# Patient Record
Sex: MALE | Race: WHITE | NOT HISPANIC OR LATINO | Employment: FULL TIME | URBAN - METROPOLITAN AREA
[De-identification: names, ages, dates, MRNs, and addresses within clinical notes are randomized per-mention and may not be internally consistent; named-entity substitution may affect disease eponyms.]

---

## 2023-10-19 ENCOUNTER — OFFICE VISIT (OUTPATIENT)
Dept: NEUROLOGY | Facility: CLINIC | Age: 58
End: 2023-10-19
Payer: COMMERCIAL

## 2023-10-19 VITALS
HEART RATE: 73 BPM | RESPIRATION RATE: 16 BRPM | WEIGHT: 239 LBS | DIASTOLIC BLOOD PRESSURE: 92 MMHG | SYSTOLIC BLOOD PRESSURE: 142 MMHG | HEIGHT: 73 IN | BODY MASS INDEX: 31.68 KG/M2 | TEMPERATURE: 96.8 F

## 2023-10-19 DIAGNOSIS — M54.50 CHRONIC LOW BACK PAIN, UNSPECIFIED BACK PAIN LATERALITY, UNSPECIFIED WHETHER SCIATICA PRESENT: ICD-10-CM

## 2023-10-19 DIAGNOSIS — R20.2 PARESTHESIAS: Primary | ICD-10-CM

## 2023-10-19 DIAGNOSIS — R06.83 SNORING: ICD-10-CM

## 2023-10-19 DIAGNOSIS — G89.29 CHRONIC LOW BACK PAIN, UNSPECIFIED BACK PAIN LATERALITY, UNSPECIFIED WHETHER SCIATICA PRESENT: ICD-10-CM

## 2023-10-19 DIAGNOSIS — G47.30 OBSERVED SLEEP APNEA: ICD-10-CM

## 2023-10-19 DIAGNOSIS — G62.9 POLYNEUROPATHY: ICD-10-CM

## 2023-10-19 PROCEDURE — 1036F TOBACCO NON-USER: CPT | Performed by: PSYCHIATRY & NEUROLOGY

## 2023-10-19 PROCEDURE — 99204 OFFICE O/P NEW MOD 45 MIN: CPT | Performed by: PSYCHIATRY & NEUROLOGY

## 2023-10-19 RX ORDER — EVOLOCUMAB 140 MG/ML
140 INJECTION, SOLUTION SUBCUTANEOUS
COMMUNITY
Start: 2017-04-23 | End: 2024-01-23 | Stop reason: WASHOUT

## 2023-10-19 RX ORDER — AMLODIPINE BESYLATE 10 MG/1
10 TABLET ORAL DAILY
COMMUNITY
End: 2024-01-23 | Stop reason: WASHOUT

## 2023-10-19 RX ORDER — LATANOPROSTENE BUNOD 0.24 MG/ML
SOLUTION/ DROPS OPHTHALMIC
COMMUNITY
End: 2024-01-23 | Stop reason: WASHOUT

## 2023-10-19 RX ORDER — PANTOPRAZOLE SODIUM 40 MG/1
40 TABLET, DELAYED RELEASE ORAL
COMMUNITY
End: 2024-01-23 | Stop reason: WASHOUT

## 2023-10-19 RX ORDER — ASPIRIN 81 MG/1
81 TABLET ORAL DAILY
COMMUNITY
End: 2024-01-23 | Stop reason: WASHOUT

## 2023-10-19 RX ORDER — METOPROLOL TARTRATE 25 MG/1
25 TABLET, FILM COATED ORAL 2 TIMES DAILY
COMMUNITY
End: 2024-01-23 | Stop reason: WASHOUT

## 2023-10-19 RX ORDER — SILDENAFIL 50 MG/1
50 TABLET, FILM COATED ORAL AS NEEDED
COMMUNITY
Start: 2023-09-23 | End: 2024-01-23 | Stop reason: WASHOUT

## 2023-10-19 ASSESSMENT — ENCOUNTER SYMPTOMS
NUMBNESS: 1
WEAKNESS: 1

## 2023-10-19 NOTE — PATIENT INSTRUCTIONS
The patient needs an EMG nerve conduction study of the right upper and right lower extremity.  The patient needs an MRI of the lumbar spine without contrast.  The patient needs a polyneuropathy panel.  The patient should try to stay as active as he can both mentally and physically.  The patient does need a diagnostic polysomnogram.  The patient needs to lose weight to his ideal body weight, avoid supine position, improve sleep hygiene and get least 8 hours of sleep at night.  The patient should not drive while drowsy.  I discussed all these issues in detail with the patient and answered all of his questions.  The patient will follow-up with me in 6 months.

## 2023-10-19 NOTE — PROGRESS NOTES
Subjective     Javy Carnes Jr. is a 58 y.o. year old male    HPI  The patient states that he began to notice numbness in his feet bilaterally.  He states that the numbness started in the toes and it has spread to involve his feet as well.  He does feel that his legs are somewhat weak but he denies difficulty getting out of a chair or going up or down stairs.  The patient states that if he is standing in place for any length of time that the symptoms will worsen and that if he is moving around the symptoms will improve.  The patient denies that his walking is unsteady or numbness in his fingers.  He states that he has chronic back pain and has had it since 1995.  The patient states that he had neck pain in early 2000's and received an injection in his neck and his neck has been good since that time.  The patient denies any upper extremity weakness or numbness.  He denies any vision changes, speech problems, memory loss, hearing loss or problems with coordination.  He denies any bowel or bladder complaints.  The patient had a lumbar spine x-ray on 6/3/2020 that showed degenerative changes and he had moderate anterior listhesis and minimal retrolisthesis.  The patient has been seen by Dr. Novak in cardiology who did a PVR and the patient reports that this was normal.  The patient has not had an EMG nerve conduction study or recent MRI of the lumbar spine.  The patient denies any history of significant alcohol intake or diabetes.    The patient has residual right facial weakness and states he has had Bell's palsy 3 separate times in his life.  The first time was right out of high school and then over the last 20 years he has had it 2 other times.    The patient states that he has a history of snoring and also observed apneas at night.  The patient has lights out at 11 PM and it takes him just minutes to fall asleep.  The patient will wake to go to the bathroom 1 time at night.  The patient has never woken himself up  "gasping for air.  The patient will wake for the day 6 AM and he does not feel that his sleep is refreshing.  The patient states that he does not nap during the day.    Review of Systems   Neurological:  Positive for weakness and numbness.     There is no problem list on file for this patient.    Past Medical History:   Diagnosis Date    Hyperlipidemia     Hypertension      Past Surgical History:   Procedure Laterality Date    CHOLECYSTECTOMY      IR INTERVENTION VENOUS STENT      TONSILLECTOMY      VASECTOMY       Social History     Tobacco Use    Smoking status: Former     Types: Cigarettes     Passive exposure: Never    Smokeless tobacco: Never   Substance Use Topics    Alcohol use: Yes     Comment: social     family history includes Stroke in his paternal grandmother.    Current Outpatient Medications:     amLODIPine (Norvasc) 10 mg tablet, Take 1 tablet (10 mg) by mouth once daily., Disp: , Rfl:     aspirin 81 mg EC tablet, Take 1 tablet (81 mg) by mouth once daily., Disp: , Rfl:     evolocumab (Repatha SureClick) 140 mg/mL injection, Inject 1 mL (140 mg) under the skin every 14 (fourteen) days., Disp: , Rfl:     latanoprostene bunod (Vyzulta) 0.024 % drops, 1 drop in both eyes before bed, Disp: , Rfl:     metoprolol tartrate (Lopressor) 25 mg tablet, Take 1 tablet (25 mg) by mouth 2 times a day., Disp: , Rfl:     pantoprazole (ProtoNix) 40 mg EC tablet, Take 1 tablet (40 mg) by mouth once daily in the morning. Take before meals., Disp: , Rfl:     sildenafil (Viagra) 50 mg tablet, Take 1 tablet (50 mg) by mouth if needed., Disp: , Rfl:   Allergies   Allergen Reactions    Statins-Hmg-Coa Reductase Inhibitors Unknown     Dr told not to take anymore due to elevated liver enzymes       Objective   BP (!) 142/92 (BP Location: Right arm)   Pulse 73   Temp 36 °C (96.8 °F) (Temporal)   Resp 16   Ht 1.854 m (6' 1\")   Wt 108 kg (239 lb)   BMI 31.53 kg/m²    GENERAL APPEARANCE:  No distress, alert and cooperative. "     The patient has a Mallampati class III airway.    CARDIOVASCULAR: Regular, rate and rhythm, without murmur. No carotid bruits. Pulses +2 and equal in all extremities. No edema, or tenderness to palpation.    MENTAL STATE:  Orientation was normal to time, place and person. Recent and remote memory was intact.  Attention span and concentration were normal. Language testing was normal for comprehension, repetition, expression, and naming. Calculation was intact. The patient could correctly interpret a picture, and copy a diagram. General fund of knowledge was intact. Mini-mental status examination was performed with no errors.     OPHTHALMOSCOPIC: The ophthalmoscopic exam normal. The fundi were well visualized with normal disc margins, clear vessels and vascular pulsations. No disc edema. The cup/disk ratio was not enlarged. No hemorrhages or exudates were present in the posterior segments that were visualized.     CRANIAL NERVES:  Cranial nerves were normal.      CN 2- Visual Acuity  OD: 20/20 (corrected)   OS: 20/20 (corrected); visual fields full to confrontation.      CN 3, 4, 6-  Pupils round, 4 mm in diameter, equally reactive to light. No ptosis. EOMs normal alignment, full range of movement, no nystagmus     CN 5- Facial sensation intact bilaterally. Normal corneal reflexes.      CN 7- Normal and symmetric facial strength. Nasolabial folds symmetric.     CN 8- Hearing intact to finger rub, whisper.      CN 9- Palate elevates symmetrically. Normal gag reflex.      CN 11- Normal strength of shoulder shrug and neck turning      CN 12- Tongue midline, with normal bulk and strength; no fasciculations.     MOTOR:  Motor exam was normal. Muscle bulk and tone were normal in both upper and lower extremities. Muscle strength was 5/5 in distal and proximal muscles in both upper and lower extremities. No fasciculations, tremor or other abnormal movements were present.     REFLEXES:  Right/ Left:  Biceps 1/2,  brachioradialis 1/2, triceps 1/2, patellar 1/2, ankle 0/2  Babinski: toes downgoing to plantar stimulation. No clonus, frontal release signs or other pathologic reflexes present.     SENSORY: Sensory exam was intact to light touch, sharp/dull, and position sense but the patient has decreased sensation to vibration sense in a length dependent manner in the lower extremities bilaterally.    COORDINATION: MINISTERIO were intact in upper and lower extremities.  In UE- finger-nose-finger was intact and in LE- heel-to-shin was intact without dysmetria or overshoot.      GAIT: Station was stable with a normal base and negative Romberg sign. Gait was stable with a normal arm swing and speed. No ataxia, shuffling, steppage or waddling was noted. Tandem gait was intact. Postural reflexes were normal.     Assessment/Plan   Impression: The patient is complaining of paresthesias in his feet.  The patient does have snoring and observed apneas at night.  The patient's neurological examination is mildly abnormal.  The differential diagnosis for the paresthesias in his feet include polyneuropathy, multiple lumbar radiculopathies, lumbosacral plexopathy and lumbar stenosis.  The differential diagnosis for his snoring includes snoring versus sleep apnea.    Plan: The patient needs an EMG nerve conduction study of the right upper and right lower extremity.  The patient needs an MRI of the lumbar spine without contrast.  The patient needs a polyneuropathy panel.  The patient should try to stay as active as he can both mentally and physically.  The patient does need a diagnostic polysomnogram.  The patient needs to lose weight to his ideal body weight, avoid supine position, improve sleep hygiene and get least 8 hours of sleep at night.  The patient should not drive while drowsy.  I discussed all these issues in detail with the patient and answered all of his questions.  The patient will follow-up with me in 6 months.

## 2023-10-21 ENCOUNTER — LAB (OUTPATIENT)
Dept: LAB | Facility: LAB | Age: 58
End: 2023-10-21
Payer: COMMERCIAL

## 2023-10-21 DIAGNOSIS — G62.9 POLYNEUROPATHY: ICD-10-CM

## 2023-10-21 DIAGNOSIS — R20.2 PARESTHESIAS: ICD-10-CM

## 2023-10-21 LAB
ALBUMIN SERPL BCP-MCNC: 4.5 G/DL (ref 3.4–5)
ALP SERPL-CCNC: 89 U/L (ref 33–120)
ALT SERPL W P-5'-P-CCNC: 33 U/L (ref 10–52)
ANION GAP SERPL CALC-SCNC: 11 MMOL/L (ref 10–20)
AST SERPL W P-5'-P-CCNC: 19 U/L (ref 9–39)
BILIRUB SERPL-MCNC: 0.4 MG/DL (ref 0–1.2)
BUN SERPL-MCNC: 14 MG/DL (ref 6–23)
CALCIUM SERPL-MCNC: 9.2 MG/DL (ref 8.6–10.3)
CHLORIDE SERPL-SCNC: 106 MMOL/L (ref 98–107)
CO2 SERPL-SCNC: 27 MMOL/L (ref 21–32)
CREAT SERPL-MCNC: 1.02 MG/DL (ref 0.5–1.3)
ERYTHROCYTE [SEDIMENTATION RATE] IN BLOOD BY WESTERGREN METHOD: 8 MM/H (ref 0–20)
FOLATE SERPL-MCNC: 13.7 NG/ML
GFR SERPL CREATININE-BSD FRML MDRD: 86 ML/MIN/1.73M*2
GLUCOSE SERPL-MCNC: 160 MG/DL (ref 74–99)
POTASSIUM SERPL-SCNC: 4.8 MMOL/L (ref 3.5–5.3)
PROT SERPL-MCNC: 6.9 G/DL (ref 6.4–8.2)
PROT SERPL-MCNC: 7 G/DL (ref 6.4–8.2)
SODIUM SERPL-SCNC: 139 MMOL/L (ref 136–145)
TSH SERPL-ACNC: 3.61 MIU/L (ref 0.44–3.98)
VIT B12 SERPL-MCNC: 272 PG/ML (ref 211–911)

## 2023-10-21 PROCEDURE — 36415 COLL VENOUS BLD VENIPUNCTURE: CPT

## 2023-10-21 PROCEDURE — 87389 HIV-1 AG W/HIV-1&-2 AB AG IA: CPT

## 2023-10-21 PROCEDURE — 84443 ASSAY THYROID STIM HORMONE: CPT

## 2023-10-21 PROCEDURE — 84165 PROTEIN E-PHORESIS SERUM: CPT

## 2023-10-21 PROCEDURE — 82607 VITAMIN B-12: CPT

## 2023-10-21 PROCEDURE — 82746 ASSAY OF FOLIC ACID SERUM: CPT

## 2023-10-21 PROCEDURE — 83921 ORGANIC ACID SINGLE QUANT: CPT

## 2023-10-21 PROCEDURE — 84155 ASSAY OF PROTEIN SERUM: CPT

## 2023-10-21 PROCEDURE — 80053 COMPREHEN METABOLIC PANEL: CPT

## 2023-10-21 PROCEDURE — 83036 HEMOGLOBIN GLYCOSYLATED A1C: CPT

## 2023-10-21 PROCEDURE — 85652 RBC SED RATE AUTOMATED: CPT

## 2023-10-22 LAB
EST. AVERAGE GLUCOSE BLD GHB EST-MCNC: 123 MG/DL
HBA1C MFR BLD: 5.9 %
HIV 1+2 AB+HIV1 P24 AG SERPL QL IA: NONREACTIVE

## 2023-10-23 LAB
ALBUMIN: 4.3 G/DL (ref 3.4–5)
ALPHA 1 GLOBULIN: 0.3 G/DL (ref 0.2–0.6)
ALPHA 2 GLOBULIN: 0.6 G/DL (ref 0.4–1.1)
BETA GLOBULIN: 0.8 G/DL (ref 0.5–1.2)
GAMMA GLOBULIN: 1 G/DL (ref 0.5–1.4)
PATH REVIEW-SERUM PROTEIN ELECTROPHORESIS: NORMAL
PROTEIN ELECTROPHORESIS COMMENT: NORMAL

## 2023-10-25 LAB — METHYLMALONATE SERPL-SCNC: 0.11 UMOL/L (ref 0–0.4)

## 2023-10-30 ENCOUNTER — LAB (OUTPATIENT)
Dept: LAB | Facility: LAB | Age: 58
End: 2023-10-30
Payer: COMMERCIAL

## 2023-10-30 DIAGNOSIS — R20.2 PARESTHESIAS: ICD-10-CM

## 2023-10-30 DIAGNOSIS — G62.9 POLYNEUROPATHY: ICD-10-CM

## 2023-10-30 PROCEDURE — 82175 ASSAY OF ARSENIC: CPT

## 2023-11-03 LAB
ARSENIC 24H UR-MRATE: NORMAL UG/D (ref 0–49.9)
ARSENIC UR-MCNC: <10 UG/L (ref 0–34.9)
ARSENIC/CREAT UR: NORMAL UG/G CRT (ref 0–29.9)
COLLECT DURATION TIME SPEC: NORMAL HR
CREAT 24H UR-MRATE: NORMAL MG/D (ref 800–2100)
CREAT UR-MCNC: 148 MG/DL
LEAD 24H UR-MRATE: NORMAL UG/D (ref 0–8.1)
LEAD UR-MCNC: <5 UG/L (ref 0–5)
LEAD/CREAT UR: NORMAL UG/G CRT (ref 0–5)
MERCURY 24H UR-MRATE: NORMAL UG/D (ref 0–20)
MERCURY UR-MCNC: <2.5 UG/L (ref 0–5)
MERCURY/CREAT UR: NORMAL UG/G CRT (ref 0–20)
SPECIMEN VOL ?TM UR: NORMAL ML

## 2023-11-08 ENCOUNTER — HOSPITAL ENCOUNTER (OUTPATIENT)
Dept: RADIOLOGY | Facility: HOSPITAL | Age: 58
Discharge: HOME | End: 2023-11-08
Payer: COMMERCIAL

## 2023-11-08 DIAGNOSIS — M54.50 CHRONIC LOW BACK PAIN, UNSPECIFIED BACK PAIN LATERALITY, UNSPECIFIED WHETHER SCIATICA PRESENT: ICD-10-CM

## 2023-11-08 DIAGNOSIS — G89.29 CHRONIC LOW BACK PAIN, UNSPECIFIED BACK PAIN LATERALITY, UNSPECIFIED WHETHER SCIATICA PRESENT: ICD-10-CM

## 2023-11-08 DIAGNOSIS — R20.2 PARESTHESIAS: ICD-10-CM

## 2023-11-08 PROCEDURE — 72148 MRI LUMBAR SPINE W/O DYE: CPT

## 2023-11-08 PROCEDURE — 72148 MRI LUMBAR SPINE W/O DYE: CPT | Performed by: RADIOLOGY

## 2023-11-20 DIAGNOSIS — R20.2 PARESTHESIAS: ICD-10-CM

## 2023-11-20 DIAGNOSIS — G89.29 CHRONIC LOW BACK PAIN, UNSPECIFIED BACK PAIN LATERALITY, UNSPECIFIED WHETHER SCIATICA PRESENT: ICD-10-CM

## 2023-11-20 DIAGNOSIS — G62.9 POLYNEUROPATHY: Primary | ICD-10-CM

## 2023-11-20 DIAGNOSIS — M54.50 CHRONIC LOW BACK PAIN, UNSPECIFIED BACK PAIN LATERALITY, UNSPECIFIED WHETHER SCIATICA PRESENT: ICD-10-CM

## 2023-11-24 ENCOUNTER — CLINICAL SUPPORT (OUTPATIENT)
Dept: SLEEP MEDICINE | Facility: CLINIC | Age: 58
End: 2023-11-24
Payer: COMMERCIAL

## 2023-11-24 DIAGNOSIS — R06.83 SNORING: ICD-10-CM

## 2023-11-24 DIAGNOSIS — G47.33 OBSTRUCTIVE SLEEP APNEA (ADULT) (PEDIATRIC): ICD-10-CM

## 2023-11-24 DIAGNOSIS — G47.30 OBSERVED SLEEP APNEA: ICD-10-CM

## 2023-11-24 PROCEDURE — 95810 POLYSOM 6/> YRS 4/> PARAM: CPT | Performed by: PSYCHIATRY & NEUROLOGY

## 2023-11-25 VITALS
RESPIRATION RATE: 16 BRPM | BODY MASS INDEX: 31.41 KG/M2 | SYSTOLIC BLOOD PRESSURE: 150 MMHG | DIASTOLIC BLOOD PRESSURE: 81 MMHG | HEIGHT: 73 IN | WEIGHT: 236.99 LBS

## 2023-11-25 ASSESSMENT — SLEEP AND FATIGUE QUESTIONNAIRES
HOW LIKELY ARE YOU TO NOD OFF OR FALL ASLEEP WHEN YOU ARE A PASSENGER IN A CAR FOR AN HOUR WITHOUT A BREAK: WOULD NEVER DOZE
HOW LIKELY ARE YOU TO NOD OFF OR FALL ASLEEP WHILE SITTING QUIETLY AFTER LUNCH WITHOUT ALCOHOL: WOULD NEVER DOZE
HOW LIKELY ARE YOU TO NOD OFF OR FALL ASLEEP WHILE LYING DOWN TO REST IN THE AFTERNOON WHEN CIRCUMSTANCES PERMIT: SLIGHT CHANCE OF DOZING
HOW LIKELY ARE YOU TO NOD OFF OR FALL ASLEEP IN A CAR, WHILE STOPPED FOR A FEW MINUTES IN TRAFFIC: WOULD NEVER DOZE
HOW LIKELY ARE YOU TO NOD OFF OR FALL ASLEEP WHILE SITTING AND READING: SLIGHT CHANCE OF DOZING
SITING INACTIVE IN A PUBLIC PLACE LIKE A CLASS ROOM OR A MOVIE THEATER: WOULD NEVER DOZE
HOW LIKELY ARE YOU TO NOD OFF OR FALL ASLEEP WHILE SITTING AND TALKING TO SOMEONE: WOULD NEVER DOZE
ESS-CHAD TOTAL SCORE: 4
HOW LIKELY ARE YOU TO NOD OFF OR FALL ASLEEP WHILE WATCHING TV: MODERATE CHANCE OF DOZING

## 2023-11-25 NOTE — PROGRESS NOTES
[facility] TECH NOTE:     Patient: Javy Carnes Jr.   MRN//AGE: [id number]  1965  58 y.o.   Technologist: Francesca Villa   Room: 1   Service Date: 2023        Sleep Testing Location: [place]  Neck: 44 cm  Wall: 4    TECHNOLOGIST SLEEP STUDY PROCEDURE NOTE:   This sleep study is being conducted according to the policies and procedures outlined by the AAS accreditation standards.  The sleep study procedure and processes involved during this appointment was explained to the patient/patient’s family, questions were answered. The patient/family verbalized understanding.      The patient is a 58 year old male scheduled for a diagnostic PSG with montage of: PSG.  He arrived for his appointment.      The study that was ultimately completed was a diagnostic PSG with montage of: PSG.    The full study was completed.  Patient questionnaires completed?: yes     Consents signed? yes    Initial Fall Risk Screening:     Javy has not fallen in the last 6 months.  Javy does not have a fear of falling. He does not need assistance with sitting, standing, or walking. He does not need assistance walking in his home. he  does not need assistance in an unfamiliar setting. The patient is not using an assistive device.     Brief Study observations: patient presented to the sleep lab for a diagnostic PSG.  Split criteria was not met.    Deviation to order/protocol and reason: n/a      If PAP, which was preferred mask/pressure/mode: n/a      Other: n/a    After the procedure, the patient/family was informed to ensure followup with ordering clinician for testing results.      Technologist: Francesca Villa

## 2023-12-01 DIAGNOSIS — I44.30 AV BLOCK: ICD-10-CM

## 2023-12-05 DIAGNOSIS — G47.33 OSA (OBSTRUCTIVE SLEEP APNEA): ICD-10-CM

## 2023-12-26 ENCOUNTER — APPOINTMENT (OUTPATIENT)
Dept: SLEEP MEDICINE | Facility: CLINIC | Age: 58
End: 2023-12-26
Payer: COMMERCIAL

## 2024-01-23 ENCOUNTER — OFFICE VISIT (OUTPATIENT)
Dept: NEUROSURGERY | Facility: CLINIC | Age: 59
End: 2024-01-23
Payer: COMMERCIAL

## 2024-01-23 VITALS — WEIGHT: 234 LBS | BODY MASS INDEX: 31.01 KG/M2 | HEIGHT: 73 IN

## 2024-01-23 DIAGNOSIS — M54.17 RADICULOPATHY OF LUMBOSACRAL REGION: ICD-10-CM

## 2024-01-23 DIAGNOSIS — M48.07 LUMBOSACRAL STENOSIS WITH NEUROGENIC CLAUDICATION: ICD-10-CM

## 2024-01-23 DIAGNOSIS — M43.17 SPONDYLOLISTHESIS OF LUMBOSACRAL REGION: Primary | ICD-10-CM

## 2024-01-23 PROCEDURE — 1036F TOBACCO NON-USER: CPT | Performed by: NEUROLOGICAL SURGERY

## 2024-01-23 PROCEDURE — 99204 OFFICE O/P NEW MOD 45 MIN: CPT | Performed by: NEUROLOGICAL SURGERY

## 2024-01-23 RX ORDER — LATANOPROSTENE BUNOD 0.24 MG/ML
SOLUTION/ DROPS OPHTHALMIC DAILY
COMMUNITY

## 2024-01-23 RX ORDER — PANTOPRAZOLE SODIUM 40 MG/1
40 TABLET, DELAYED RELEASE ORAL
COMMUNITY

## 2024-01-23 RX ORDER — ASPIRIN 81 MG/1
81 TABLET ORAL 2 TIMES DAILY
COMMUNITY

## 2024-01-23 RX ORDER — AMLODIPINE BESYLATE 10 MG/1
10 TABLET ORAL DAILY
COMMUNITY

## 2024-01-23 RX ORDER — METOPROLOL TARTRATE 25 MG/1
25 TABLET, FILM COATED ORAL 2 TIMES DAILY
COMMUNITY

## 2024-01-23 RX ORDER — EVOLOCUMAB 140 MG/ML
140 INJECTION, SOLUTION SUBCUTANEOUS
COMMUNITY

## 2024-01-23 RX ORDER — SILDENAFIL 50 MG/1
50 TABLET, FILM COATED ORAL DAILY PRN
COMMUNITY

## 2024-01-23 ASSESSMENT — PATIENT HEALTH QUESTIONNAIRE - PHQ9
2. FEELING DOWN, DEPRESSED OR HOPELESS: NOT AT ALL
SUM OF ALL RESPONSES TO PHQ9 QUESTIONS 1 AND 2: 0
1. LITTLE INTEREST OR PLEASURE IN DOING THINGS: NOT AT ALL

## 2024-01-23 ASSESSMENT — ENCOUNTER SYMPTOMS: OCCASIONAL FEELINGS OF UNSTEADINESS: 0

## 2024-01-23 NOTE — PROGRESS NOTES
[facility] [place]  Department of Neurological Surgery  New Patient Visit    History of Present Illness:  Javy Carnes Jr. is a 58 y.o. year old male who presents to the spine clinic with complaints of mid back pain but significant numbness in his legs bilaterally.  He gets discomfort when he stands and walks but he is very comfortable sitting down.  He is also able to continue to play golf this past summer.  He says the pain is about a 4-5 out of 10 on the pain scale.  He has not done physical therapy recently.  He has not had any steroid injections in his lumbar region ever.  He was unaware of ever having fractured his lumbar spine.  He comes to us with an MRI of the lumbar spine.  He denies any loss of bowel or bladder function.    Prior Spine Surgeries: None   Physical Therapy: Not recently  Diabetic: No  Osteoporosis: No  Patient's BMI is Body mass index is 30.87 kg/m².    14/14 systems reviewed and negative other than what is listed in the history of present illness    Patient Active Problem List   Diagnosis    Spondylolisthesis of lumbosacral region    Lumbosacral stenosis with neurogenic claudication    Radiculopathy of lumbosacral region     Past Medical History:   Diagnosis Date    Hyperlipidemia     Hypertension      Past Surgical History:   Procedure Laterality Date    CHOLECYSTECTOMY      IR INTERVENTION VENOUS STENT      TONSILLECTOMY      VASECTOMY       Social History     Tobacco Use    Smoking status: Former     Types: Cigarettes     Passive exposure: Never    Smokeless tobacco: Never   Substance Use Topics    Alcohol use: Yes     Comment: social     family history includes Stroke in his paternal grandmother.    Current Outpatient Medications:     amLODIPine (Norvasc) 10 mg tablet, Take 1 tablet (10 mg) by mouth once daily., Disp: , Rfl:     aspirin 81 mg EC tablet, Take 1 tablet (81 mg) by mouth 2 times a day., Disp: , Rfl:     evolocumab (Repatha SureClick) 140 mg/mL injection,  Inject 1 mL (140 mg) under the skin every 14 (fourteen) days., Disp: , Rfl:     latanoprostene bunod (Vyzulta) 0.024 % drops, Administer into affected eye(s) once daily., Disp: , Rfl:     metoprolol tartrate (Lopressor) 25 mg tablet, Take 1 tablet (25 mg) by mouth 2 times a day., Disp: , Rfl:     pantoprazole (ProtoNix) 40 mg EC tablet, Take 1 tablet (40 mg) by mouth once daily in the morning. Take before meals. Do not crush, chew, or split., Disp: , Rfl:     sildenafil (Viagra) 50 mg tablet, Take 1 tablet (50 mg) by mouth once daily as needed for erectile dysfunction., Disp: , Rfl:   Allergies   Allergen Reactions    Statins-Hmg-Coa Reductase Inhibitors Unknown     Dr told not to take anymore due to elevated liver enzymes       Physical Examination      General: NAD, AOx 3,  no aphasia or dysarthria, normal fund of knowledge  Cranial Nerves II-XII: VFF, PERRL, EOMI, Face asymmetric but this is subtle.  He has had Bell's palsy 3 times, Facial SILT, Palate/Tongue midline and symmetric  Motor: 5/5 Throughout all extremities,  No drift, no dysmetria on finger to nose  Sensation: SILT and PP throughout all extremities  DTRS: 1+ Throughout in the upper extremities but I could not get reflexes even with recruitment techniques at the patella or the Achilles, No Hoffmans or Clonus  His gait was normal nonantalgic, he could toe and heel walk without difficulty, he has a negative Romberg    Results    I personally reviewed and interpreted the imaging results which included MRI of the lumbar spine which is dated 11/8/2023.  It demonstrates that he has bilateral pars fracture at L5 with bilateral foraminal stenosis at L5-S1.  It also shows that the L4-5 disc is slightly protruding and he has significant lateral recess stenosis on the right side.  I believe the right-sided radiculopathy is coming from the lateral recess stenosis at L4-5 and I believe the left-sided L5 radiculopathy is coming from the foraminal stenosis.   Unfortunately if we got to the point of considering surgical intervention it would probably incorporate both levels.    Assessment and Plan:    Javy Carnes Jr. is a 58 y.o. year old male who presents to the spine clinic with symptoms of neurogenic claudication.  He has been found to have a grade 1 spondylolisthesis at L5-S1 and some lateral recess stenosis at L4-5.  He has lost about 15 to 20 pounds in the past several months.  I encouraged him to continue to work at weight reduction but on the slower pace.  I think I am going to get him enrolled in a physical therapy program to work on his core strengthening.  I am also going to give him prescription for pain management in case the therapy is not enough we can have them try some epidural steroids both the L4-5 level and the L5-S1 level.  He can follow-up with us at any time if he is not getting enough relief.      I have reviewed all prior documentation and reviewed the electronic medical record since admission. I have personally have reviewed all advanced imaging not just the reports and used my interpretation as documented as the relevant findings. I have reviewed the risks and benefits of all treatment recommendations listed in this note with the patient and family. I spent a total of 45 minutes in service to this patient's care during this date of service.      The above clinical summary has been dictated with voice recognition software. It has not been proofread for grammatical errors, typographical mistakes, or other semantic inconsistencies.    Thank you for visiting our office today. It was our pleasure to take part in your healthcare.     Do not hesitate to call with any questions regarding your plan of care after leaving. My office can be reached at [phone] M-F 8am-4pm.     To clinicians, thank you very much for this kind referral. It is a privilege to partner with you in the care of your patients. My office would be delighted to assist you with  any further consultations or with questions regarding the plan of care outlined. Do not hesitate to call the office or contact me directly.     Sincerely,    Steve Prince MD, FAANS, FACS  Board Certified Neurological Surgeon  , Department of Neurological Surgery  [employer]    [facility]  [address]  [facility] [address]  [place] [id number]    [facility]  [address]  [address]  [place] [address]    Phone: [phone]  Fax: [phone]

## 2024-01-30 ENCOUNTER — HOSPITAL ENCOUNTER (OUTPATIENT)
Dept: NEUROLOGY | Facility: HOSPITAL | Age: 59
Discharge: HOME | End: 2024-01-30
Payer: COMMERCIAL

## 2024-01-30 DIAGNOSIS — G62.9 POLYNEUROPATHY: ICD-10-CM

## 2024-01-30 DIAGNOSIS — M54.50 CHRONIC LOW BACK PAIN, UNSPECIFIED BACK PAIN LATERALITY, UNSPECIFIED WHETHER SCIATICA PRESENT: ICD-10-CM

## 2024-01-30 DIAGNOSIS — R20.2 PARESTHESIAS: ICD-10-CM

## 2024-01-30 DIAGNOSIS — G89.29 CHRONIC LOW BACK PAIN, UNSPECIFIED BACK PAIN LATERALITY, UNSPECIFIED WHETHER SCIATICA PRESENT: ICD-10-CM

## 2024-01-30 PROCEDURE — 95886 MUSC TEST DONE W/N TEST COMP: CPT | Performed by: PSYCHIATRY & NEUROLOGY

## 2024-01-30 PROCEDURE — 95913 NRV CNDJ TEST 13/> STUDIES: CPT | Performed by: PSYCHIATRY & NEUROLOGY

## 2024-04-01 ENCOUNTER — PROCEDURE VISIT (OUTPATIENT)
Dept: SLEEP MEDICINE | Facility: CLINIC | Age: 59
End: 2024-04-01
Payer: COMMERCIAL

## 2024-04-01 VITALS
SYSTOLIC BLOOD PRESSURE: 146 MMHG | HEIGHT: 73 IN | BODY MASS INDEX: 30.97 KG/M2 | WEIGHT: 233.69 LBS | DIASTOLIC BLOOD PRESSURE: 83 MMHG

## 2024-04-01 DIAGNOSIS — G47.33 OSA (OBSTRUCTIVE SLEEP APNEA): ICD-10-CM

## 2024-04-01 PROCEDURE — 95811 POLYSOM 6/>YRS CPAP 4/> PARM: CPT | Performed by: PSYCHIATRY & NEUROLOGY

## 2024-04-01 ASSESSMENT — SLEEP AND FATIGUE QUESTIONNAIRES
HOW LIKELY ARE YOU TO NOD OFF OR FALL ASLEEP WHILE LYING DOWN TO REST IN THE AFTERNOON WHEN CIRCUMSTANCES PERMIT: WOULD NEVER DOZE
HOW LIKELY ARE YOU TO NOD OFF OR FALL ASLEEP WHILE SITTING AND READING: WOULD NEVER DOZE
SITING INACTIVE IN A PUBLIC PLACE LIKE A CLASS ROOM OR A MOVIE THEATER: WOULD NEVER DOZE
HOW LIKELY ARE YOU TO NOD OFF OR FALL ASLEEP WHEN YOU ARE A PASSENGER IN A CAR FOR AN HOUR WITHOUT A BREAK: WOULD NEVER DOZE
HOW LIKELY ARE YOU TO NOD OFF OR FALL ASLEEP IN A CAR, WHILE STOPPED FOR A FEW MINUTES IN TRAFFIC: WOULD NEVER DOZE
ESS-CHAD TOTAL SCORE: 1
HOW LIKELY ARE YOU TO NOD OFF OR FALL ASLEEP WHILE WATCHING TV: SLIGHT CHANCE OF DOZING
HOW LIKELY ARE YOU TO NOD OFF OR FALL ASLEEP WHILE SITTING AND TALKING TO SOMEONE: WOULD NEVER DOZE
HOW LIKELY ARE YOU TO NOD OFF OR FALL ASLEEP WHILE SITTING QUIETLY AFTER LUNCH WITHOUT ALCOHOL: WOULD NEVER DOZE

## 2024-04-02 NOTE — PROGRESS NOTES
[facility] TECH NOTE:     Patient: Javy Carnes Jr.   MRN//AGE: [id number]  1965  58 y.o.   Technologist: Tiesha Alvares   Room: 3   Service Date: 2024        [facility]    TECHNOLOGIST SLEEP STUDY PROCEDURE NOTE:   This sleep study is being conducted according to the policies and procedures outlined by the AAS accreditation standards.  The sleep study procedure and processes involved during this appointment was explained to the patient/patient’s family, questions were answered. The patient/family verbalized understanding.      The patient is a 58 y.o. year old male scheduled for aCPAP titration with montage of:  CPAP . he arrived for his appointment.      The study that was ultimately completed was aCPAP titration with montage of:  CPAP .    The full study Was completed.  Patient questionnaires completed?: yes     Consents signed? yes    Initial Fall Risk Screening:     Javy has not fallen in the last 6 months. his did not result in injury. Javy does not have a fear of falling. He does not need assistance with sitting, standing, or walking. he does not need assistance walking in his home. he does not need assistance in an unfamiliar setting. The patient is notusing an assistive device.     Brief Study observations: All sensors applied     Deviation to order/protocol and reason: None      If PAP, which was preferred mask/pressure/mode: CPAP. Respironics Nuance  Progel nasal pillows Medium    Other:None    After the procedure, the patient/family was informed to ensure followup with ordering clinician for testing results.      Technologist: BROOKS Reddy

## 2024-04-25 DIAGNOSIS — G47.33 OBSTRUCTIVE SLEEP APNEA: ICD-10-CM

## 2024-05-09 ENCOUNTER — OFFICE VISIT (OUTPATIENT)
Dept: PAIN MEDICINE | Facility: CLINIC | Age: 59
End: 2024-05-09
Payer: COMMERCIAL

## 2024-05-09 VITALS
HEART RATE: 59 BPM | HEIGHT: 72 IN | RESPIRATION RATE: 18 BRPM | OXYGEN SATURATION: 98 % | BODY MASS INDEX: 31.56 KG/M2 | WEIGHT: 233 LBS | TEMPERATURE: 97.5 F | SYSTOLIC BLOOD PRESSURE: 140 MMHG | DIASTOLIC BLOOD PRESSURE: 94 MMHG

## 2024-05-09 DIAGNOSIS — M48.07 LUMBOSACRAL STENOSIS WITH NEUROGENIC CLAUDICATION: ICD-10-CM

## 2024-05-09 DIAGNOSIS — M54.17 RADICULOPATHY OF LUMBOSACRAL REGION: ICD-10-CM

## 2024-05-09 DIAGNOSIS — M43.17 SPONDYLOLISTHESIS OF LUMBOSACRAL REGION: ICD-10-CM

## 2024-05-09 PROCEDURE — 99213 OFFICE O/P EST LOW 20 MIN: CPT | Performed by: ANESTHESIOLOGY

## 2024-05-09 PROCEDURE — 99203 OFFICE O/P NEW LOW 30 MIN: CPT | Performed by: ANESTHESIOLOGY

## 2024-05-09 SDOH — ECONOMIC STABILITY: FOOD INSECURITY: WITHIN THE PAST 12 MONTHS, YOU WORRIED THAT YOUR FOOD WOULD RUN OUT BEFORE YOU GOT MONEY TO BUY MORE.: NEVER TRUE

## 2024-05-09 SDOH — ECONOMIC STABILITY: FOOD INSECURITY: WITHIN THE PAST 12 MONTHS, THE FOOD YOU BOUGHT JUST DIDN'T LAST AND YOU DIDN'T HAVE MONEY TO GET MORE.: NEVER TRUE

## 2024-05-09 ASSESSMENT — COLUMBIA-SUICIDE SEVERITY RATING SCALE - C-SSRS
2. HAVE YOU ACTUALLY HAD ANY THOUGHTS OF KILLING YOURSELF?: NO
1. IN THE PAST MONTH, HAVE YOU WISHED YOU WERE DEAD OR WISHED YOU COULD GO TO SLEEP AND NOT WAKE UP?: NO
6. HAVE YOU EVER DONE ANYTHING, STARTED TO DO ANYTHING, OR PREPARED TO DO ANYTHING TO END YOUR LIFE?: NO

## 2024-05-09 ASSESSMENT — ENCOUNTER SYMPTOMS
EYE PAIN: 0
ADENOPATHY: 0
LOSS OF SENSATION IN FEET: 1
BACK PAIN: 1
WEAKNESS: 1
FEVER: 0
SHORTNESS OF BREATH: 0
OCCASIONAL FEELINGS OF UNSTEADINESS: 0
DEPRESSION: 0
ABDOMINAL PAIN: 0
NUMBNESS: 1

## 2024-05-09 ASSESSMENT — LIFESTYLE VARIABLES
SKIP TO QUESTIONS 9-10: 1
HOW OFTEN DO YOU HAVE A DRINK CONTAINING ALCOHOL: 2-4 TIMES A MONTH
AUDIT-C TOTAL SCORE: 2
HOW MANY STANDARD DRINKS CONTAINING ALCOHOL DO YOU HAVE ON A TYPICAL DAY: 1 OR 2
HOW OFTEN DO YOU HAVE SIX OR MORE DRINKS ON ONE OCCASION: NEVER
TOTAL SCORE: 0

## 2024-05-09 ASSESSMENT — PAIN SCALES - GENERAL
PAINLEVEL_OUTOF10: 3
PAINLEVEL: 3

## 2024-05-09 ASSESSMENT — PAIN DESCRIPTION - DESCRIPTORS: DESCRIPTORS: ACHING;BURNING;TINGLING;NUMBNESS

## 2024-05-09 ASSESSMENT — PATIENT HEALTH QUESTIONNAIRE - PHQ9
SUM OF ALL RESPONSES TO PHQ9 QUESTIONS 1 AND 2: 0
2. FEELING DOWN, DEPRESSED OR HOPELESS: NOT AT ALL
1. LITTLE INTEREST OR PLEASURE IN DOING THINGS: NOT AT ALL

## 2024-05-09 ASSESSMENT — PAIN - FUNCTIONAL ASSESSMENT: PAIN_FUNCTIONAL_ASSESSMENT: 0-10

## 2024-05-09 NOTE — PROGRESS NOTES
Chief Complain    New Patient Visit (Pain is in low back, that has been going on 20 years. Deny neck and back surgery. No hip or knee pain at the time. Have images on file with . Currently only been taking asa, heating pad and Tens unit. Start  Pt on Monday. Was referred by Dr tinajero.Would like to discuss cortisone injection.)    History Of Present Illness  Javy Carnes Jr. is a 58 y.o. male here for evaluation of low back pain, radiating to bilateral lower extremity numbness. The patient has been experiencing these symptoms for last 1.5 year(s). The patient describes the pain as aching, numbness. The patient's current pain score is 2-3 on a scale from 0-10. The pain is worsened by bending forward and is alleviated by  TENS unit, resting . Since the start of the symptoms the pain has been worse.    The patient denies any fever, chills, weight loss, bladder/ bowel incontinence, history of cancer, history of IV drug abuse, recent trauma.      Past Medical History  He has a past medical history of Hyperlipidemia and Hypertension.    Surgical History  He has a past surgical history that includes IR intervention venous stent; Cholecystectomy; Tonsillectomy; and Vasectomy.    Social History  He reports that he has quit smoking. His smoking use included cigarettes. He has never been exposed to tobacco smoke. He has never used smokeless tobacco. He reports current alcohol use. He reports that he does not use drugs.    Family History  Family History   Problem Relation Name Age of Onset    Stroke Paternal Grandmother          Allergies  Statins-hmg-coa reductase inhibitors    Review of Systems  Review of Systems   Constitutional:  Negative for fever.   HENT:  Negative for ear pain.    Eyes:  Negative for pain.   Respiratory:  Negative for shortness of breath.    Cardiovascular:  Negative for chest pain.   Gastrointestinal:  Negative for abdominal pain.   Endocrine: Negative for cold intolerance and heat intolerance.    Musculoskeletal:  Positive for back pain.   Skin:  Negative for rash.   Allergic/Immunologic: Negative for food allergies.   Neurological:  Positive for weakness and numbness.   Hematological:  Negative for adenopathy.   Psychiatric/Behavioral:  Negative for suicidal ideas.         Physical Exam  Physical Exam  HENT:      Head: Normocephalic and atraumatic.   Eyes:      Extraocular Movements: Extraocular movements intact.      Pupils: Pupils are equal, round, and reactive to light.   Cardiovascular:      Rate and Rhythm: Normal rate.   Pulmonary:      Effort: Pulmonary effort is normal.   Abdominal:      Palpations: Abdomen is soft.   Musculoskeletal:      Cervical back: Neck supple.      Lumbar back: No swelling, edema, deformity or signs of trauma. Decreased range of motion. No scoliosis.        Back:    Skin:     General: Skin is warm.   Neurological:      Mental Status: He is alert and oriented to person, place, and time.      Motor: Motor function is intact.      Coordination: Coordination is intact.      Gait: Gait is intact.      Deep Tendon Reflexes:      Reflex Scores:       Patellar reflexes are 2+ on the right side and 2+ on the left side.       Achilles reflexes are 0 on the right side and 0 on the left side.  Psychiatric:         Mood and Affect: Mood normal.         Behavior: Behavior normal.           Last Recorded Vitals  Blood pressure (!) 140/94, pulse 59, temperature 36.4 °C (97.5 °F), resp. rate 18, height 1.829 m (6'), weight 106 kg (233 lb), SpO2 98%.    Reviewed Images  Reviewed and independently interpreted MRI Lumbar spine grade 1 anterolisthesis L5 over S1 with bilateral neuroforaminal stenosis as well as lateral recess stenosis at L4-5    Reviewed Labs   Latest Reference Range & Units 10/21/23 08:55   GLUCOSE 74 - 99 mg/dL 160 (H)   SODIUM 136 - 145 mmol/L 139   POTASSIUM 3.5 - 5.3 mmol/L 4.8   CHLORIDE 98 - 107 mmol/L 106   Bicarbonate 21 - 32 mmol/L 27   Anion Gap 10 - 20 mmol/L 11    Blood Urea Nitrogen 6 - 23 mg/dL 14   Creatinine 0.50 - 1.30 mg/dL 1.02   EGFR >60 mL/min/1.73m*2 86   Calcium 8.6 - 10.3 mg/dL 9.2   Albumin 3.4 - 5.0 g/dL 4.5   Alkaline Phosphatase 33 - 120 U/L 89   ALT 10 - 52 U/L 33   AST 9 - 39 U/L 19   Bilirubin Total 0.0 - 1.2 mg/dL 0.4   (H): Data is abnormally high      Latest Reference Range & Units 02/10/23 06:46   LEUKOCYTES (10*3/UL) IN BLOOD BY AUTOMATED COUNT, [place] 4.4 - 11.3 x10E9/L 9.1   nRBC 0.0 - 0.0 /100 WBC 0.0   ERYTHROCYTES (10*6/UL) IN BLOOD BY AUTOMATED COUNT, [place] 4.50 - 5.90 x10E12/L 5.09   HEMOGLOBIN 13.5 - 17.5 g/dL 14.6   HEMATOCRIT 41.0 - 52.0 % 45.2   MCV 80 - 100 fL 89   MCHC 32.0 - 36.0 g/dL 32.3   RED CELL DISTRIBUTION WIDTH 11.5 - 14.5 % 13.5   PLATELETS (10*3/UL) IN BLOOD AUTOMATED COUNT, [place] 150 - 450 x10E9/L 225   NEUTROPHILS/100 LEUKOCYTES IN BLOOD BY AUTOMATED COUNT, [place] 40.0 - 80.0 % 50.4   Immature Granulocytes %, Automated 0.0 - 0.9 % 0.3   Lymphocytes % 13.0 - 44.0 % 35.4   Monocytes % 2.0 - 10.0 % 9.8   Eosinophils % 0.0 - 6.0 % 3.3   Basophils % 0.0 - 2.0 % 0.8   NEUTROPHILS (10*3/UL) IN BLOOD BY AUTOMATED COUNT, [place] 1.20 - 7.70 x10E9/L 4.59   Lymphocytes Absolute 1.20 - 4.80 x10E9/L 3.22   Monocytes Absolute 0.10 - 1.00 x10E9/L 0.89   Eosinophils Absolute 0.00 - 0.70 x10E9/L 0.30     Assessment/Plan   Encounter Diagnoses   Name Primary?    Spondylolisthesis of lumbosacral region     Radiculopathy of lumbosacral region     Lumbosacral stenosis with neurogenic claudication         Javy Carnes Jr. is a 58 y.o. male here for evaluation of chronic low back pain, numbness of bilateral feet.  He has been experiencing low back issues for several years, numbness in the feet for last couple of years or so.  He was evaluated by neurology, an MRI of his lumbar spine was done which does reveal grade 1 anterolisthesis at L5-S1 as well as lateral recess stenosis L4-5.  He has since then been evaluated by neurosurgery who  has referred him for physical therapy.  Imaging were reviewed, therapeutic options were discussed including epidural steroid injections, surgical interventions.  He would like to go ahead with L4-5 epidural steroid injection.  Continue with physical therapy as previously scheduled.           Conor Carrasco MD

## 2024-05-09 NOTE — H&P (VIEW-ONLY)
Chief Complain    New Patient Visit (Pain is in low back, that has been going on 20 years. Deny neck and back surgery. No hip or knee pain at the time. Have images on file with . Currently only been taking asa, heating pad and Tens unit. Start  Pt on Monday. Was referred by Dr tinajero.Would like to discuss cortisone injection.)    History Of Present Illness  Javy Carnes Jr. is a 58 y.o. male here for evaluation of low back pain, radiating to bilateral lower extremity numbness. The patient has been experiencing these symptoms for last 1.5 year(s). The patient describes the pain as aching, numbness. The patient's current pain score is 2-3 on a scale from 0-10. The pain is worsened by bending forward and is alleviated by  TENS unit, resting . Since the start of the symptoms the pain has been worse.    The patient denies any fever, chills, weight loss, bladder/ bowel incontinence, history of cancer, history of IV drug abuse, recent trauma.      Past Medical History  He has a past medical history of Hyperlipidemia and Hypertension.    Surgical History  He has a past surgical history that includes IR intervention venous stent; Cholecystectomy; Tonsillectomy; and Vasectomy.    Social History  He reports that he has quit smoking. His smoking use included cigarettes. He has never been exposed to tobacco smoke. He has never used smokeless tobacco. He reports current alcohol use. He reports that he does not use drugs.    Family History  Family History   Problem Relation Name Age of Onset    Stroke Paternal Grandmother          Allergies  Statins-hmg-coa reductase inhibitors    Review of Systems  Review of Systems   Constitutional:  Negative for fever.   HENT:  Negative for ear pain.    Eyes:  Negative for pain.   Respiratory:  Negative for shortness of breath.    Cardiovascular:  Negative for chest pain.   Gastrointestinal:  Negative for abdominal pain.   Endocrine: Negative for cold intolerance and heat intolerance.    Musculoskeletal:  Positive for back pain.   Skin:  Negative for rash.   Allergic/Immunologic: Negative for food allergies.   Neurological:  Positive for weakness and numbness.   Hematological:  Negative for adenopathy.   Psychiatric/Behavioral:  Negative for suicidal ideas.         Physical Exam  Physical Exam  HENT:      Head: Normocephalic and atraumatic.   Eyes:      Extraocular Movements: Extraocular movements intact.      Pupils: Pupils are equal, round, and reactive to light.   Cardiovascular:      Rate and Rhythm: Normal rate.   Pulmonary:      Effort: Pulmonary effort is normal.   Abdominal:      Palpations: Abdomen is soft.   Musculoskeletal:      Cervical back: Neck supple.      Lumbar back: No swelling, edema, deformity or signs of trauma. Decreased range of motion. No scoliosis.        Back:    Skin:     General: Skin is warm.   Neurological:      Mental Status: He is alert and oriented to person, place, and time.      Motor: Motor function is intact.      Coordination: Coordination is intact.      Gait: Gait is intact.      Deep Tendon Reflexes:      Reflex Scores:       Patellar reflexes are 2+ on the right side and 2+ on the left side.       Achilles reflexes are 0 on the right side and 0 on the left side.  Psychiatric:         Mood and Affect: Mood normal.         Behavior: Behavior normal.           Last Recorded Vitals  Blood pressure (!) 140/94, pulse 59, temperature 36.4 °C (97.5 °F), resp. rate 18, height 1.829 m (6'), weight 106 kg (233 lb), SpO2 98%.    Reviewed Images  Reviewed and independently interpreted MRI Lumbar spine grade 1 anterolisthesis L5 over S1 with bilateral neuroforaminal stenosis as well as lateral recess stenosis at L4-5    Reviewed Labs   Latest Reference Range & Units 10/21/23 08:55   GLUCOSE 74 - 99 mg/dL 160 (H)   SODIUM 136 - 145 mmol/L 139   POTASSIUM 3.5 - 5.3 mmol/L 4.8   CHLORIDE 98 - 107 mmol/L 106   Bicarbonate 21 - 32 mmol/L 27   Anion Gap 10 - 20 mmol/L 11    Blood Urea Nitrogen 6 - 23 mg/dL 14   Creatinine 0.50 - 1.30 mg/dL 1.02   EGFR >60 mL/min/1.73m*2 86   Calcium 8.6 - 10.3 mg/dL 9.2   Albumin 3.4 - 5.0 g/dL 4.5   Alkaline Phosphatase 33 - 120 U/L 89   ALT 10 - 52 U/L 33   AST 9 - 39 U/L 19   Bilirubin Total 0.0 - 1.2 mg/dL 0.4   (H): Data is abnormally high      Latest Reference Range & Units 02/10/23 06:46   LEUKOCYTES (10*3/UL) IN BLOOD BY AUTOMATED COUNT, [place] 4.4 - 11.3 x10E9/L 9.1   nRBC 0.0 - 0.0 /100 WBC 0.0   ERYTHROCYTES (10*6/UL) IN BLOOD BY AUTOMATED COUNT, [place] 4.50 - 5.90 x10E12/L 5.09   HEMOGLOBIN 13.5 - 17.5 g/dL 14.6   HEMATOCRIT 41.0 - 52.0 % 45.2   MCV 80 - 100 fL 89   MCHC 32.0 - 36.0 g/dL 32.3   RED CELL DISTRIBUTION WIDTH 11.5 - 14.5 % 13.5   PLATELETS (10*3/UL) IN BLOOD AUTOMATED COUNT, [place] 150 - 450 x10E9/L 225   NEUTROPHILS/100 LEUKOCYTES IN BLOOD BY AUTOMATED COUNT, [place] 40.0 - 80.0 % 50.4   Immature Granulocytes %, Automated 0.0 - 0.9 % 0.3   Lymphocytes % 13.0 - 44.0 % 35.4   Monocytes % 2.0 - 10.0 % 9.8   Eosinophils % 0.0 - 6.0 % 3.3   Basophils % 0.0 - 2.0 % 0.8   NEUTROPHILS (10*3/UL) IN BLOOD BY AUTOMATED COUNT, [place] 1.20 - 7.70 x10E9/L 4.59   Lymphocytes Absolute 1.20 - 4.80 x10E9/L 3.22   Monocytes Absolute 0.10 - 1.00 x10E9/L 0.89   Eosinophils Absolute 0.00 - 0.70 x10E9/L 0.30     Assessment/Plan   Encounter Diagnoses   Name Primary?    Spondylolisthesis of lumbosacral region     Radiculopathy of lumbosacral region     Lumbosacral stenosis with neurogenic claudication         Javy Carnes Jr. is a 58 y.o. male here for evaluation of chronic low back pain, numbness of bilateral feet.  He has been experiencing low back issues for several years, numbness in the feet for last couple of years or so.  He was evaluated by neurology, an MRI of his lumbar spine was done which does reveal grade 1 anterolisthesis at L5-S1 as well as lateral recess stenosis L4-5.  He has since then been evaluated by neurosurgery who  has referred him for physical therapy.  Imaging were reviewed, therapeutic options were discussed including epidural steroid injections, surgical interventions.  He would like to go ahead with L4-5 epidural steroid injection.  Continue with physical therapy as previously scheduled.           Conor Carrasco MD

## 2024-05-13 ENCOUNTER — EVALUATION (OUTPATIENT)
Dept: PHYSICAL THERAPY | Facility: CLINIC | Age: 59
End: 2024-05-13
Payer: COMMERCIAL

## 2024-05-13 DIAGNOSIS — M54.17 RADICULOPATHY OF LUMBOSACRAL REGION: ICD-10-CM

## 2024-05-13 DIAGNOSIS — M48.07 LUMBOSACRAL STENOSIS WITH NEUROGENIC CLAUDICATION: ICD-10-CM

## 2024-05-13 DIAGNOSIS — M43.17 SPONDYLOLISTHESIS OF LUMBOSACRAL REGION: ICD-10-CM

## 2024-05-13 PROCEDURE — 97161 PT EVAL LOW COMPLEX 20 MIN: CPT | Mod: GP

## 2024-05-13 PROCEDURE — 97110 THERAPEUTIC EXERCISES: CPT | Mod: GP

## 2024-05-20 ENCOUNTER — TREATMENT (OUTPATIENT)
Dept: PHYSICAL THERAPY | Facility: CLINIC | Age: 59
End: 2024-05-20
Payer: COMMERCIAL

## 2024-05-20 DIAGNOSIS — M43.17 SPONDYLOLISTHESIS OF LUMBOSACRAL REGION: ICD-10-CM

## 2024-05-20 DIAGNOSIS — M48.07 LUMBOSACRAL STENOSIS WITH NEUROGENIC CLAUDICATION: ICD-10-CM

## 2024-05-20 DIAGNOSIS — M54.17 RADICULOPATHY OF LUMBOSACRAL REGION: ICD-10-CM

## 2024-05-20 PROCEDURE — 97110 THERAPEUTIC EXERCISES: CPT | Mod: GP,CQ

## 2024-05-20 NOTE — PROGRESS NOTES
Physical Therapy  Physical Therapy Progress Note    Patient Name Javy Carnes Jr.   MRN: [id number]  Today's Date: 05/20/24  Time Calculation  Start Time: 0800  Stop Time: 0838  Time Calculation (min): 38 min    Insurance:     [employer] 20v/pcy 0 used copay $25, pays 100% OOP $6600  -authorization required: no  -visit 2    Therapy Diagnoses:   1. Spondylolisthesis of lumbosacral region  Follow Up In Physical Therapy      2. Radiculopathy of lumbosacral region  Follow Up In Physical Therapy      3. Lumbosacral stenosis with neurogenic claudication  Follow Up In Physical Therapy          General:  Reason for visit: back pain and numbness in feet   Referred by: Dr lakshmi Queen MD appt:  neurologist in a few weeks  Preferred Name:  Javy  Script:  eval and treat  Onset Date:  10/1/23    Assessment:  Patient tolerated treatment well, did well with progression this date. Challenged with hip circles.  Updated HEP  Patient needs continued work on/skilled PT for: strengthening to address remaining functional, objective and subjective deficits to allow them to return to prior /optimal level of function with ADLs.  Patient is progressing with goals: compliance with HEP  Skilled care:  exercise progression    Plan:    Continue to progress per poc:   NV add standing stabilization    Subjective:   Patient reports:  some back pain though a little less than on eval , generally around a 3, numbness is constant. Scheduled for injection Wednesday.    Have you fallen since last visit:  no    Precautions:  Current Medical management:     PMHx: heart disease, two stents 2016, HTN, high cholesterol, gall bladder removed, sleep apnea treatment 5/9/24     Medications for pain: n/a     Diagnostic Tests: MRI    Pain:  3/10  which is average  Location/Type of pain:  back/ numbness in the feet    HEP compliance/understanding:  yes    Objective:   Objective Measurements:        Posture: The pt had rounded shoulders, forward head and decreased  lumbar lordosis. The pt has decreased postural and body mechanics awareness. Slight lateral shift to the L.       Treatment:   **= HEP  NV= Next visit  np= not performed  nb= non-billable  G= group HEP= discharged to HEP  Therapeutic Exercise:     38 minutes  Nu step L3 6  Standing hamstring stretch 20 sec 3x **  Seated flex stretch 10 sec 5x **  SKC 5 sec 10x **  LTR 5 sec 10x  **  Supine piriformis stretch 20 sec 3x **  Prone quad stretch with green strap 20 sec 3x **  Small SLR 5 sec 10x **  Sidely hip circles cw/ccw 10x each **    Manual Therapy:       minutes      Neuromuscular Re-education:      minutes      Education:  exercise progression  HEP Progression:     Access Code: [email or web address]  URL: [email or web address]  Date: 05/20/2024  Prepared by: Malena    Exercises  - Hooklying Single Knee to Chest  - 1-2 x daily - 7 x weekly - 10 reps - 3-5 hold  - Supine Lower Trunk Rotation  - 2 x daily - 7 x weekly - 1-2 sets - 10 reps - 2 hold  - Supine Piriformis Stretch with Foot on Ground  - 1-2 x daily - 7 x weekly - 2-3 sets - 20-30 hold  - Standing Hamstring Stretch on Chair  - 1 x daily - 7 x weekly - 1 sets - 3 reps - 20-30 hold  - Seated Lumbar Flexion Stretch  - 1 x daily - 7 x weekly - 1 sets - 5 reps - 10 hold  - Prone Quadriceps Stretch with Strap  - 1 x daily - 7 x weekly - 1 sets - 3 reps - 20-20 hold  - Small Range Straight Leg Raise  - 1 x daily - 7 x weekly - 1 sets - 10 reps - 5 hold  - Sidelying Hip Circles  - 1 x daily - 7 x weekly - 2 sets - 10 reps - 5 hold

## 2024-05-22 ENCOUNTER — HOSPITAL ENCOUNTER (OUTPATIENT)
Dept: RADIOLOGY | Facility: CLINIC | Age: 59
Discharge: HOME | End: 2024-05-22
Payer: COMMERCIAL

## 2024-05-22 ENCOUNTER — HOSPITAL ENCOUNTER (OUTPATIENT)
Dept: PAIN MEDICINE | Facility: CLINIC | Age: 59
Discharge: HOME | End: 2024-05-22
Payer: COMMERCIAL

## 2024-05-22 VITALS
HEART RATE: 63 BPM | DIASTOLIC BLOOD PRESSURE: 81 MMHG | RESPIRATION RATE: 18 BRPM | TEMPERATURE: 98.4 F | OXYGEN SATURATION: 97 % | SYSTOLIC BLOOD PRESSURE: 129 MMHG

## 2024-05-22 DIAGNOSIS — M54.17 RADICULOPATHY OF LUMBOSACRAL REGION: ICD-10-CM

## 2024-05-22 PROCEDURE — 62323 NJX INTERLAMINAR LMBR/SAC: CPT | Performed by: ANESTHESIOLOGY

## 2024-05-22 PROCEDURE — 2500000004 HC RX 250 GENERAL PHARMACY W/ HCPCS (ALT 636 FOR OP/ED)

## 2024-05-22 PROCEDURE — A4216 STERILE WATER/SALINE, 10 ML: HCPCS

## 2024-05-22 PROCEDURE — 7100000009 HC PHASE TWO TIME - INITIAL BASE CHARGE

## 2024-05-22 PROCEDURE — 2500000005 HC RX 250 GENERAL PHARMACY W/O HCPCS

## 2024-05-22 PROCEDURE — 7100000010 HC PHASE TWO TIME - EACH INCREMENTAL 1 MINUTE

## 2024-05-22 RX ORDER — LIDOCAINE HYDROCHLORIDE 10 MG/ML
INJECTION, SOLUTION EPIDURAL; INFILTRATION; INTRACAUDAL; PERINEURAL
Status: COMPLETED
Start: 2024-05-22 | End: 2024-05-22

## 2024-05-22 RX ORDER — DEXAMETHASONE SODIUM PHOSPHATE 10 MG/ML
INJECTION INTRAMUSCULAR; INTRAVENOUS
Status: COMPLETED
Start: 2024-05-22 | End: 2024-05-22

## 2024-05-22 RX ORDER — ROPIVACAINE HYDROCHLORIDE 5 MG/ML
INJECTION, SOLUTION EPIDURAL; INFILTRATION; PERINEURAL
Status: COMPLETED
Start: 2024-05-22 | End: 2024-05-22

## 2024-05-22 RX ORDER — METHYLPREDNISOLONE ACETATE 40 MG/ML
INJECTION, SUSPENSION INTRA-ARTICULAR; INTRALESIONAL; INTRAMUSCULAR; SOFT TISSUE
Status: COMPLETED
Start: 2024-05-22 | End: 2024-05-22

## 2024-05-22 RX ORDER — SODIUM CHLORIDE 9 MG/ML
INJECTION, SOLUTION INTRAMUSCULAR; INTRAVENOUS; SUBCUTANEOUS
Status: COMPLETED
Start: 2024-05-22 | End: 2024-05-22

## 2024-05-22 RX ADMIN — SODIUM CHLORIDE 10 ML: 9 INJECTION, SOLUTION INTRAMUSCULAR; INTRAVENOUS; SUBCUTANEOUS at 13:47

## 2024-05-22 RX ADMIN — METHYLPREDNISOLONE ACETATE 40 MG: 40 INJECTION, SUSPENSION INTRA-ARTICULAR; INTRALESIONAL; INTRAMUSCULAR; INTRASYNOVIAL; SOFT TISSUE at 13:47

## 2024-05-22 RX ADMIN — DEXAMETHASONE SODIUM PHOSPHATE 10 MG: 10 INJECTION INTRAMUSCULAR; INTRAVENOUS at 13:53

## 2024-05-22 RX ADMIN — LIDOCAINE HYDROCHLORIDE 300 MG: 10 INJECTION, SOLUTION EPIDURAL; INFILTRATION; INTRACAUDAL; PERINEURAL at 13:47

## 2024-05-22 RX ADMIN — ROPIVACAINE HYDROCHLORIDE 100 MG: 5 INJECTION, SOLUTION EPIDURAL; INFILTRATION; PERINEURAL at 13:46

## 2024-05-22 ASSESSMENT — ENCOUNTER SYMPTOMS
DEPRESSION: 0
LOSS OF SENSATION IN FEET: 1
OCCASIONAL FEELINGS OF UNSTEADINESS: 0

## 2024-05-22 ASSESSMENT — PAIN - FUNCTIONAL ASSESSMENT
PAIN_FUNCTIONAL_ASSESSMENT: 0-10
PAIN_FUNCTIONAL_ASSESSMENT: 0-10

## 2024-05-22 ASSESSMENT — PAIN SCALES - GENERAL
PAINLEVEL_OUTOF10: 4
PAINLEVEL_OUTOF10: 4

## 2024-05-22 NOTE — Clinical Note
Prepped with ChloraPrep, a minimum of 3 minute dry time, longer if needed, no pooling noted, patient draped in sterile fashion. Lumbar epidural steroid injection

## 2024-05-22 NOTE — OP NOTE
Procedure Note     Date: 2024  OR Location: [facility] NON-OR PROCEDURES    Name: Javy Carnes Jr., : 1965, Age: 58 y.o., MRN: [id number], Sex: male    Diagnosis  Preprocedure diagnosis: Lumbar Radiculopathy  Postprocedure diagnosis: Same    Procedures  Lumbar Epidural Steroid Injection    The patient was seen in the preoperative area. The risks, benefits, complications, treatment options, non-operative alternatives, expected recovery and outcomes were discussed with the patient. The patient concurred with the proposed plan, giving informed consent.      Procedure Details:   Lumbar Epidural Steroid Injection Procedure Note      Procedure: The risks and benefits of treatment options and alternatives were discussed with the patient, and consent was obtained for a Lumbar epidural steroid injection. He wishes to proceed. He was placed in a prone position. Area overlying the L4-5 space was cleaned with ChloraPrep solution and draped using standard sterile precautions. Skin was anesthetized with 1% lidocaine. A 3.5 inch 20 G Touhy needle was advanced with AP, lateral, oblique fluoroscopy to the L4-5 epidural space using loss-of-resistance technique. No paresthesias were induced. 2 ml of Omnipaque was injected demonstrating spread of the dye  in the epidural space. No intravascular spread was noticed. After negative aspiration for blood and CSF, a solution containing Dexamethasone 10mg and 2 ml of normal saline was injected without inducing paresthesia or pain. Patient was transferred to recovery in stable condition and subsequently discharged home.        Complications:  None; patient tolerated the procedure well.    Disposition: Home  Condition: stable         Additional Details: NA    Attending Attestation: I performed the procedure.    Conor Carrasco MD

## 2024-05-24 ENCOUNTER — TREATMENT (OUTPATIENT)
Dept: PHYSICAL THERAPY | Facility: CLINIC | Age: 59
End: 2024-05-24
Payer: COMMERCIAL

## 2024-05-24 DIAGNOSIS — M54.17 RADICULOPATHY OF LUMBOSACRAL REGION: ICD-10-CM

## 2024-05-24 DIAGNOSIS — M48.07 LUMBOSACRAL STENOSIS WITH NEUROGENIC CLAUDICATION: ICD-10-CM

## 2024-05-24 DIAGNOSIS — M43.17 SPONDYLOLISTHESIS OF LUMBOSACRAL REGION: ICD-10-CM

## 2024-05-24 PROCEDURE — 97110 THERAPEUTIC EXERCISES: CPT | Mod: GP

## 2024-05-24 NOTE — PROGRESS NOTES
Physical Therapy  Physical Therapy Progress Note    Patient Name Javy Carnes Jr.   MRN: [id number]  Today's Date: 05/24/24  Time Calculation  Start Time: 1516  Stop Time: 1600  Time Calculation (min): 44 min    Insurance:     [employer] 20v/pcy 0 used copay $25, pays 100% OOP $6600  -authorization required: no  -visit 3    Therapy Diagnoses:   1. Spondylolisthesis of lumbosacral region  Follow Up In Physical Therapy      2. Radiculopathy of lumbosacral region  Follow Up In Physical Therapy      3. Lumbosacral stenosis with neurogenic claudication  Follow Up In Physical Therapy          General:  Reason for visit: back pain and numbness in feet   Referred by: Dr lakshmi Queen MD appt:  neurologist in a few weeks  Preferred Name:  Javy  Script:  eval and treat  Onset Date:  10/1/23    Assessment:  Patient tolerated treatment well, did well with progression this date.  No complaint of increased pain at the end of the session.  Patient stated his exercises are taking too long at home.  Advised patient to decrease the repetition and hold it longer.  Updated HEP  Patient needs continued work on/skilled PT for: strengthening to address remaining functional, objective and subjective deficits to allow them to return to prior /optimal level of function with ADLs.  Patient is progressing with goals: compliance with HEP  Skilled care:  exercise progression    Plan:    Continue to progress per poc:   NV add standing stabilization    Subjective:   Patient reports:  he received injection on Wednesday.  He felt relief immediately, pain level has decreased 2/10 and numbness is 4/10.  He is hoping pain will be decreased next few days.    Have you fallen since last visit:  no    Precautions:  Current Medical management:     PMHx: heart disease, two stents 2016, HTN, high cholesterol, gall bladder removed, sleep apnea treatment 5/9/24     Medications for pain: n/a     Diagnostic Tests: MRI    Pain:  3/10  which is  average  Location/Type of pain:  back/ numbness in the feet    HEP compliance/understanding:  yes    Objective:   Objective Measurements:        Posture: The pt had rounded shoulders, forward head and decreased lumbar lordosis. The pt has decreased postural and body mechanics awareness. Slight lateral shift to the L.       Treatment:   **= HEP  NV= Next visit  np= not performed  nb= non-billable  G= group HEP= discharged to [facility]  Therapeutic Exercise:     44 minutes  Nu step L3 6  Standing hamstring stretch 20 sec 3x   Seated flex stretch 10 sec 5x   SKC 5 sec 10x   Red physio ball heel pulls x 10 **  Red physio ball LTR 5 sec 10x **  Bridges x10 red physio ball**  Supine piriformis stretch 20 sec 3x   Prone quad stretch with green strap 20 sec 3x   Small SLR 5 sec 10x   Sidely hip circles cw/ccw 10 x each**  SL Hip abduction 10x each **      Manual Therapy:       minutes      Neuromuscular Re-education:      minutes      Education:  exercise progression  HEP Progression:     Access Code: [email or web address]  URL: [email or web address]  Date: 05/20/2024  Prepared by: Malena    Exercises  - Hooklying Single Knee to Chest  - 1-2 x daily - 7 x weekly - 10 reps - 3-5 hold  - Supine Lower Trunk Rotation  - 2 x daily - 7 x weekly - 1-2 sets - 10 reps - 2 hold  - Supine Piriformis Stretch with Foot on Ground  - 1-2 x daily - 7 x weekly - 2-3 sets - 20-30 hold  - Standing Hamstring Stretch on Chair  - 1 x daily - 7 x weekly - 1 sets - 3 reps - 20-30 hold  - Seated Lumbar Flexion Stretch  - 1 x daily - 7 x weekly - 1 sets - 5 reps - 10 hold  - Prone Quadriceps Stretch with Strap  - 1 x daily - 7 x weekly - 1 sets - 3 reps - 20-20 hold  - Small Range Straight Leg Raise  - 1 x daily - 7 x weekly - 1 sets - 10 reps - 5 hold  - Sidelying Hip Circles  - 1 x daily - 7 x weekly - 2 sets - 10 reps - 5 hold

## 2024-05-28 ENCOUNTER — APPOINTMENT (OUTPATIENT)
Dept: NEUROLOGY | Facility: CLINIC | Age: 59
End: 2024-05-28
Payer: COMMERCIAL

## 2024-05-29 ENCOUNTER — TREATMENT (OUTPATIENT)
Dept: PHYSICAL THERAPY | Facility: CLINIC | Age: 59
End: 2024-05-29
Payer: COMMERCIAL

## 2024-05-29 DIAGNOSIS — M48.07 LUMBOSACRAL STENOSIS WITH NEUROGENIC CLAUDICATION: ICD-10-CM

## 2024-05-29 DIAGNOSIS — M54.17 RADICULOPATHY OF LUMBOSACRAL REGION: ICD-10-CM

## 2024-05-29 DIAGNOSIS — M43.17 SPONDYLOLISTHESIS OF LUMBOSACRAL REGION: Primary | ICD-10-CM

## 2024-05-29 PROCEDURE — 97110 THERAPEUTIC EXERCISES: CPT | Mod: GP,CQ

## 2024-05-29 NOTE — PROGRESS NOTES
Physical Therapy  Physical Therapy Progress Note    Patient Name Javy Carnes Jr.   MRN: [id number]  Today's Date: 05/29/24  Time Calculation  Start Time: 0745  Stop Time: 0825  Time Calculation (min): 40 min    Insurance:     [employer] 20v/pcy 0 used copay $25, pays 100% OOP $6600  -authorization required: no  -visit 4    Therapy Diagnoses:   1. Spondylolisthesis of lumbosacral region  Follow Up In Physical Therapy      2. Radiculopathy of lumbosacral region  Follow Up In Physical Therapy      3. Lumbosacral stenosis with neurogenic claudication  Follow Up In Physical Therapy          General:  Reason for visit: back pain and numbness in feet   Referred by: Dr lakshmi Queen MD appt:  neurologist in a few weeks  Preferred Name:  Javy  Script:  eval and treat  Onset Date:  10/1/23    Assessment:  Patient tolerated treatment well, did well with progression  for stabilization this date.  No complaint of increased pain at the end of the session.  Updated HEP, issued blue tband  and loop.  Patient needs continued work on/skilled PT for: strengthening to address remaining functional, objective and subjective deficits to allow them to return to prior /optimal level of function with ADLs.  Patient is progressing with goals: compliance with HEP  Skilled care:  exercise progression    Plan:    Continue to progress per poc:   NV progress standing stabilization    Subjective:   Patient reports:  Doing better since injection on Wednesday.  His pain level has decreased 1-2/10 , though  numbness is about the same at a 4/10.     Have you fallen since last visit:  no    Precautions:  Current Medical management:     PMHx: heart disease, two stents 2016, HTN, high cholesterol, gall bladder removed, sleep apnea treatment 5/9/24     Medications for pain: n/a     Diagnostic Tests: MRI    Pain:  1-2/10  which is average  Location/Type of pain:  back/ numbness in the feet    HEP compliance/understanding:  yes    Objective:   Objective  Measurements:        Posture: The pt had rounded shoulders, forward head and decreased lumbar lordosis. The pt has decreased postural and body mechanics awareness. Slight lateral shift to the L.       Treatment:   **= HEP  NV= Next visit  np= not performed  nb= non-billable  G= group HEP= discharged to HEP  Therapeutic Exercise:     40 minutes  Nu step L3 6  Standing hamstring stretch 20 sec 3x   Seated flex stretch 10 sec 5x with green PB SB 5x each way   SKC 5 sec 10x   HEP  Red physio ball heel pulls x 10 ** NP  Red physio ball LTR 5 sec 10x **  NP  Bridges x10 red physio ball**  Supine piriformis stretch 20 sec 3x   Prone quad stretch with green strap 20 sec 3x   Small SLR 5 sec 10x 1# **  Sidely hip circles cw/ccw 10 x each**  HEP  NP  SL Hip abduction 10x each **  1#      Manual Therapy:       minutes      Neuromuscular Re-education:      minutes      Education:  exercise progression/ posture instruction  HEP Progression:       Access Code: [email or web address]  URL: [email or web address]  Date: 05/29/2024  Prepared by: Malena    Exercises  - Doorway Pec Stretch at 60 Elevation  - 1 x daily - 7 x weekly - 1 sets - 2 reps - 30 hold    DLS flex /ext blue 2 x 10  Access Code: [email or web address]  URL: [email or web address]  Date: 05/20/2024  Prepared by: Malena    Exercises  - Hooklying Single Knee to Chest  - 1-2 x daily - 7 x weekly - 10 reps - 3-5 hold  - Supine Lower Trunk Rotation  - 2 x daily - 7 x weekly - 1-2 sets - 10 reps - 2 hold  - Supine Piriformis Stretch with Foot on Ground  - 1-2 x daily - 7 x weekly - 2-3 sets - 20-30 hold  - Standing Hamstring Stretch on Chair  - 1 x daily - 7 x weekly - 1 sets - 3 reps - 20-30 hold  - Seated Lumbar Flexion Stretch  - 1 x daily - 7 x weekly - 1 sets - 5 reps - 10 hold  - Prone Quadriceps Stretch with Strap  - 1 x daily - 7 x weekly - 1 sets - 3 reps - 20-20 hold  - Small Range Straight Leg Raise  - 1 x daily - 7 x weekly - 1 sets - 10 reps - 5  hold  - Sidelying Hip Circles  - 1 x daily - 7 x weekly - 2 sets - 10 reps - 5 hold

## 2024-05-31 ENCOUNTER — TREATMENT (OUTPATIENT)
Dept: PHYSICAL THERAPY | Facility: CLINIC | Age: 59
End: 2024-05-31
Payer: COMMERCIAL

## 2024-05-31 DIAGNOSIS — M54.17 RADICULOPATHY OF LUMBOSACRAL REGION: ICD-10-CM

## 2024-05-31 DIAGNOSIS — M43.17 SPONDYLOLISTHESIS OF LUMBOSACRAL REGION: ICD-10-CM

## 2024-05-31 DIAGNOSIS — M48.07 LUMBOSACRAL STENOSIS WITH NEUROGENIC CLAUDICATION: ICD-10-CM

## 2024-05-31 PROCEDURE — 97110 THERAPEUTIC EXERCISES: CPT | Mod: GP

## 2024-05-31 NOTE — PROGRESS NOTES
Physical Therapy  Physical Therapy Progress Note    Patient Name Javy Carnes Jr.   MRN: [id number]  Today's Date: 05/31/24  Time Calculation  Start Time: 1432  Stop Time: 1515  Time Calculation (min): 43 min    Insurance:     [employer] 20v/pcy 0 used copay $25, pays 100% OOP $6600  -authorization required: no  -visit 5    Therapy Diagnoses:   1. Spondylolisthesis of lumbosacral region  Follow Up In Physical Therapy      2. Radiculopathy of lumbosacral region  Follow Up In Physical Therapy      3. Lumbosacral stenosis with neurogenic claudication  Follow Up In Physical Therapy          General:  Reason for visit: back pain and numbness in feet   Referred by: Dr lakshmi Queen MD appt:  neurologist in a few weeks  Preferred Name:  Javy  Script:  eval and treat  Onset Date:  10/1/23    Assessment:  Patient tolerated treatment well, did well with progression  for stabilization this date.  No complaint of increased pain at the end of the session.  Overall patient is improving.  Patient needs continued work on/skilled PT for: strengthening to address remaining functional, objective and subjective deficits to allow them to return to prior /optimal level of function with ADLs.  Patient is progressing with goals: compliance with HEP  Skilled care:  exercise progression    Plan:    Continue to progress per poc:   NV progress standing stabilization    Subjective:   Patient reports:  Doing better since injection on Wednesday.  His pain level has decreased 1-2/10 , though  numbness  in his feet about the same at a 4/10 on the right, left side is 3/10.     Have you fallen since last visit:  no    Precautions:  Current Medical management:     PMHx: heart disease, two stents 2016, HTN, high cholesterol, gall bladder removed, sleep apnea treatment 5/9/24     Medications for pain: n/a     Diagnostic Tests: MRI    Pain:  1-2/10  which is average  Location/Type of pain:  back/ numbness in the feet    HEP compliance/understanding:   yes    Objective:   Objective Measurements:        Posture: The pt has rounded shoulders, forward head and decreased lumbar lordosis. The pt has decreased postural and body mechanics awareness. Slight lateral shift to the L.       Treatment:   **= HEP  NV= Next visit  np= not performed  nb= non-billable  G= group HEP= discharged to [facility]  Therapeutic Exercise:     43 minutes  Nu step L3 6  Standing hamstring stretch 20 sec 3x   Seated flex stretch 10 sec 5x with green PB SB 5x each way   SKC 5 sec 10x   HEP  Red physio ball heel pulls x 10 -NP  Red physio ball LTR 5 sec 10x-NP  Bridges x10 red physio ball-NP  Supine piriformis stretch figure 4, 20 sec 3x**   Prone quad stretch with green strap 20 sec 3x   Small SLR 5 sec 10x 1# **  Sidely hip circles cw/ccw 10 x each**  HEP  NP  SL Hip abduction 10x each **  1#  Green PB trunk flexion stretch in sitting x 5  Green PB trunk flexion and side to side rotation x 5 each side  Standing Blue TB shoulder extension at waist level and from over head x 10**  Standing Blue TB shoulder rowing at waist level and overhead x 10**  Standing Blue TB forward shoulder flexion waist level x 10**      Manual Therapy:       minutes      Neuromuscular Re-education:      minutes      Education:  exercise progression/ posture instruction  HEP Progression:       Access Code: [email or web address]  URL: [email or web address]  Date: 05/29/2024  Prepared by: Malena    Exercises  - Doorway Pec Stretch at 60 Elevation  - 1 x daily - 7 x weekly - 1 sets - 2 reps - 30 hold    DLS flex /ext blue 2 x 10  Access Code: [email or web address]  URL: [email or web address]  Date: 05/20/2024  Prepared by: Malena    Exercises  - Hooklying Single Knee to Chest  - 1-2 x daily - 7 x weekly - 10 reps - 3-5 hold  - Supine Lower Trunk Rotation  - 2 x daily - 7 x weekly - 1-2 sets - 10 reps - 2 hold  - Supine Piriformis Stretch with Foot on Ground  - 1-2 x daily - 7 x weekly - 2-3 sets - 20-30 hold  -  Standing Hamstring Stretch on Chair  - 1 x daily - 7 x weekly - 1 sets - 3 reps - 20-30 hold  - Seated Lumbar Flexion Stretch  - 1 x daily - 7 x weekly - 1 sets - 5 reps - 10 hold  - Prone Quadriceps Stretch with Strap  - 1 x daily - 7 x weekly - 1 sets - 3 reps - 20-20 hold  - Small Range Straight Leg Raise  - 1 x daily - 7 x weekly - 1 sets - 10 reps - 5 hold  - Sidelying Hip Circles  - 1 x daily - 7 x weekly - 2 sets - 10 reps - 5 hold

## 2024-06-03 ENCOUNTER — TREATMENT (OUTPATIENT)
Dept: PHYSICAL THERAPY | Facility: CLINIC | Age: 59
End: 2024-06-03
Payer: COMMERCIAL

## 2024-06-03 DIAGNOSIS — M43.17 SPONDYLOLISTHESIS OF LUMBOSACRAL REGION: ICD-10-CM

## 2024-06-03 DIAGNOSIS — M54.17 RADICULOPATHY OF LUMBOSACRAL REGION: ICD-10-CM

## 2024-06-03 DIAGNOSIS — M48.07 LUMBOSACRAL STENOSIS WITH NEUROGENIC CLAUDICATION: ICD-10-CM

## 2024-06-03 PROCEDURE — 97112 NEUROMUSCULAR REEDUCATION: CPT | Mod: GP

## 2024-06-03 PROCEDURE — 97110 THERAPEUTIC EXERCISES: CPT | Mod: GP

## 2024-06-03 NOTE — PROGRESS NOTES
Physical Therapy  Physical Therapy Progress Note    Patient Name Javy Carnes Jr.   MRN: [id number]  Today's Date: 06/03/24  Time Calculation  Start Time: 0803  Stop Time: 0848  Time Calculation (min): 45 min    Insurance:     MMO 20v/pcy 0 used copay $25, pays 100% OOP $6600  -authorization required: no  -visit 6    Therapy Diagnoses:   1. Spondylolisthesis of lumbosacral region  Follow Up In Physical Therapy      2. Radiculopathy of lumbosacral region  Follow Up In Physical Therapy      3. Lumbosacral stenosis with neurogenic claudication  Follow Up In Physical Therapy          General:  Reason for visit: back pain and numbness in feet   Referred by: Dr lakshmi Queen MD appt:  neurologist in a few weeks  Preferred Name:  Javy  Script:  eval and treat  Onset Date:  10/1/23    Assessment:  The pt had improved alignment following MET.  He tolerated the new exercises well.   Patient needs continued work on/skilled PT for: strengthening to address remaining functional, objective and subjective deficits to allow them to return to prior /optimal level of function with ADLs.  Patient is progressing with goals: pain reduction, leg length, flexibility, strength, HEP  Skilled care:  exercise progression, NMR, manual techniques, pt education/ HEP    Plan:    Continue to progress per poc:   Continue to assess leg length/ SI joint alignment  Progress exercises strengthening core/ hip abductors/extensors    Subjective:   Patient reports:  He continues to feel better.  His pain level has stayed at about 2/10 , though  numbness is about the same at a L 4/10, R 5/10    Have you fallen since last visit:  no    Precautions:  Current Medical management:     PMHx: heart disease, two stents 2016, HTN, high cholesterol, gall bladder removed, sleep apnea treatment 5/9/24     Medications for pain: n/a     Diagnostic Tests: MRI    Pain:  1-2/10  which is average  Location/Type of pain:  back/ numbness in the feet    HEP  "compliance/understanding:  yes    Objective:   Objective Measurements:    Leg Length: R LE longer in supine and long sitting, improved but not equal following MET    Treatment:   **= HEP  NV= Next visit  np= not performed  nb= non-billable  G= group HEP= discharged to HEP  Therapeutic Exercise:     30 minutes  Nu step L4 7 mins  Standing hamstring stretch 20 sec 3x   Seated flex stretch 10 sec 5x with blue  SB 5x each way   SKC 5 sec 10x   HEP  Red physio ball heel pulls x 10 ** NP  Red physio ball LTR 5 sec 10x **  NP  Bridges with hip abd: green 5\" x 10 **  S/L clamshells: green 10 x 2 **  Supine piriformis stretch 20 sec 3x   Prone quad stretch with green strap 20 sec 3x NP  Small SLR 5 sec 10x 1# ** NP  Sidely hip circles cw/ccw 10 x each**  HEP  NP  SL Hip abduction 10x each **  1# NP      Manual Therapy:     5 minutes  MET R ham/ L quad: 7\" x 5     Neuromuscular Re-education:    10 minutes  DLS 3 way pulldowns blue 10 x 2 each    Education:  exercise progression/ posture instruction  HEP Progression:     Access Code: [email or web address]  URL: [email or web address]  Date: 06/03/2024  Prepared by: Renetta Pitts    Exercises  - Supine Bridge with Resistance Band  - 4 x weekly - 1-2 sets - 10 reps - 5 hold  - Clamshell with Resistance  - 1 x daily - 4 x weekly - 1-3 sets - 10 reps  "

## 2024-06-10 ENCOUNTER — TREATMENT (OUTPATIENT)
Dept: PHYSICAL THERAPY | Facility: CLINIC | Age: 59
End: 2024-06-10
Payer: COMMERCIAL

## 2024-06-10 DIAGNOSIS — M43.17 SPONDYLOLISTHESIS OF LUMBOSACRAL REGION: ICD-10-CM

## 2024-06-10 DIAGNOSIS — M48.07 LUMBOSACRAL STENOSIS WITH NEUROGENIC CLAUDICATION: ICD-10-CM

## 2024-06-10 DIAGNOSIS — M54.17 RADICULOPATHY OF LUMBOSACRAL REGION: ICD-10-CM

## 2024-06-10 PROCEDURE — 97140 MANUAL THERAPY 1/> REGIONS: CPT | Mod: GP

## 2024-06-10 PROCEDURE — 97110 THERAPEUTIC EXERCISES: CPT | Mod: GP

## 2024-06-10 NOTE — PROGRESS NOTES
Physical Therapy  Physical Therapy Progress Note    Patient Name Javy Carnes Jr.   MRN: [id number]  Today's Date: 06/10/24  Time Calculation  Start Time: 1645  Stop Time: 1735  Time Calculation (min): 50 min    Insurance:     MMO 20v/pcy 0 used copay $25, pays 100% OOP $6600  -authorization required: no  -visit 7    Therapy Diagnoses:   1. Spondylolisthesis of lumbosacral region  Follow Up In Physical Therapy      2. Radiculopathy of lumbosacral region  Follow Up In Physical Therapy    Follow Up In Physical Therapy      3. Lumbosacral stenosis with neurogenic claudication  Follow Up In Physical Therapy    Follow Up In Physical Therapy          General:  Reason for visit: back pain and numbness in feet   Referred by: Dr lakshmi Queen MD appt:  neurologist in a few weeks  Preferred Name:  Javy  Script:  eval and treat  Onset Date:  10/1/23    Assessment:  The pt is progressing well towards goals (see objective section).  He still has some limitations and would benefit from following up in a few weeks after focusing on the HEP to have the exercises progressed as needed. Pt issued a heel lift to see if this helps reduce his pain level.   Patient needs continued work on/skilled PT for: strengthening to address remaining functional, objective and subjective deficits to allow them to return to prior /optimal level of function with ADLs.  Patient is progressing with goals: pain reduction, leg length, flexibility, strength, HEP  Skilled care:  exercise progression, NMR, manual techniques, pt education/ HEP, re-evaluation    Plan:    Pt to follow up in a few weeks as needed for re-assessment and progression of exercises.   Pt to use 6mm heel lift as needed.     Subjective:   Patient reports:  He continues to feel better.  His pain level has stayed at about 2/10 , though  numbness is about the same at a L 4/10, R 5/10.  He reports compliance with the HEP and is tolerating it well.     Have you fallen since last visit:   "no    Precautions:  Current Medical management:     PMHx: heart disease, two stents 2016, HTN, high cholesterol, gall bladder removed, sleep apnea treatment 5/9/24     Medications for pain: n/a     Diagnostic Tests: MRI    Pain:  1-2/10  which is average  Location/Type of pain:  back/ numbness in the feet    HEP compliance/understanding:  yes    Objective:   Objective Measurements:    Pain:           Back pain average 2-3/10, numbness 4-5/10, pain can go up to 10 at times;   Average back pain 2/10, numbness 4-5/10     Posture:           The pt had rounded shoulders, forward head and decreased lumbar lordosis. The pt has decreased postural and body mechanics awareness. Slight lateral shift to the L. ;  Improved alignment and postural/ body mechanics awareness.      Gait:            The pt is (I) without an assistive device ; improved tolerance for walking     Stairs:           The pt is (I) with stairs     Balance:           SLS R 10\" + , L 10 + seconds ;  n/a     ROM:           Lumbar flexion 2 \" fingers from floor ; 1\"                        extension n/a due to spondylolisthesis                        SB R 21.5\", L 21 \" fingers from floor ; 20\" 20\"                         Rotation R 70 % , L 60 % ; R 75%, L 75%     Strength:           Abdominals 4/5 ; 4+          Back extensors 4/5 ; 4+          The pt was able to toe and heel walk yes          Hip flexors R/L 5/5 ; 5/5          Hip abductors R/L 4/4+ ; 4+/5-          Hip adductors R/L 5-/5- ; 5/5          Hip extensors R/L 4/4+ ; 4+/5-          Quads R/L 5/5          Hams R/L 5/5          DF R/L 5/5          PF R/L 5/5          Great toe extensors R/L 5/5     Flexibility:           Hamstrings R - 30 degrees, L - 30 degrees; R -35, L -35          Gluteals R min-mod , L min restriction ; R min, L min          Piriformis R max , L max restriction ; R mod-max, L mod-max          Hip adductors R n/a , L n/a          Quads R mod, L min-mod restriction          Hip " "flexors R n/a , L na/ restriction     Transfers:            Sit to stand (I)          Supine to sit (I)     Special tests:          Leg length: in supine R longer ; in long sitting R even longer ; SLR (-) pull in hamstrings ; Slump n/a ;  R longer in supine, issued 6mm heel lift for L shoe     Palpation:           Tenderness with palpation in R QL ; decreased tenderness       Treatment:   **= HEP  NV= Next visit  np= not performed  nb= non-billable  G= group HEP= discharged to HEP  Therapeutic Exercise:  35     minutes  Re-evaluation  Nu step L4 7 mins  Standing hamstring stretch 20 sec 3x   Seated flex stretch 10 sec 5x with blue  SB 5x each way NP  SKC 5 sec 10x   HEP  Red physio ball heel pulls x 10 ** NP  Red physio ball LTR 5 sec 10x **  NP  Bridges with hip abd: green 5\" x 10 **  S/L clamshells: green 10 x 2 **  Supine piriformis stretch 20 sec 3x   Prone quad stretch with green strap 20 sec 3x NP  Small SLR 5 sec 10x 1# ** NP  Sidely hip circles cw/ccw 10 x each**  HEP  NP  SL Hip abduction 10x each **  1# NP      Manual Therapy:   15    minutes  MET R ham/ L quad: 7\" x 5   STM low back/ gluteal region    Neuromuscular Re-education:      minutes  DLS 3 way pulldowns blue 10 x 2 each NP    Education:  exercise progression/ posture instruction  Pt ed on progression towards goals and HEP  HEP Progression:     reviewed    "

## 2024-07-01 ENCOUNTER — APPOINTMENT (OUTPATIENT)
Dept: NEUROLOGY | Facility: CLINIC | Age: 59
End: 2024-07-01
Payer: COMMERCIAL

## 2024-07-01 VITALS
HEART RATE: 60 BPM | WEIGHT: 237.6 LBS | SYSTOLIC BLOOD PRESSURE: 128 MMHG | TEMPERATURE: 98.1 F | BODY MASS INDEX: 31.49 KG/M2 | HEIGHT: 73 IN | RESPIRATION RATE: 16 BRPM | DIASTOLIC BLOOD PRESSURE: 82 MMHG

## 2024-07-01 DIAGNOSIS — G56.01 CARPAL TUNNEL SYNDROME OF RIGHT WRIST: ICD-10-CM

## 2024-07-01 DIAGNOSIS — M54.16 LUMBAR RADICULOPATHY, CHRONIC: ICD-10-CM

## 2024-07-01 DIAGNOSIS — G47.33 OSA (OBSTRUCTIVE SLEEP APNEA): ICD-10-CM

## 2024-07-01 DIAGNOSIS — G47.33 OSA (OBSTRUCTIVE SLEEP APNEA): Primary | ICD-10-CM

## 2024-07-01 DIAGNOSIS — M48.061 LUMBAR STENOSIS WITHOUT NEUROGENIC CLAUDICATION: ICD-10-CM

## 2024-07-01 PROCEDURE — G2211 COMPLEX E/M VISIT ADD ON: HCPCS | Performed by: PSYCHIATRY & NEUROLOGY

## 2024-07-01 PROCEDURE — 1036F TOBACCO NON-USER: CPT | Performed by: PSYCHIATRY & NEUROLOGY

## 2024-07-01 PROCEDURE — 99215 OFFICE O/P EST HI 40 MIN: CPT | Performed by: PSYCHIATRY & NEUROLOGY

## 2024-07-01 ASSESSMENT — ENCOUNTER SYMPTOMS
LOSS OF SENSATION IN FEET: 1
OCCASIONAL FEELINGS OF UNSTEADINESS: 0
DEPRESSION: 0

## 2024-07-01 NOTE — PROGRESS NOTES
Subjective     Javy Carnes Jr. 58 y.o.  HPI    The patient states that he has lights out at about 11 PM and it takes him about 20 minutes to fall asleep.  The patient will wake for the day at 6 AM.  The patient states that he is at least trying to use his CPAP every night but some nights he will take it off in the middle of the night.  His [place] Sleepiness Scale score today was 3.  The patient feels that the pressure is too high for him to breathe against.  The patient states that he has tried a nasal mask with pillows.  The patient is changing his mask and cleaning his machine on a regular basis.    The patient did have a diagnostic polysomnogram that showed severe sleep apnea with an AHI of 31.0.  The patient's low oxygen saturation was 81%.    The patient subsequent had a CPAP titration study that showed his optimal pressure was 13 cm of water.  At this pressure the patient had 42.5 minutes of sleep time during which the residual AHI was 0 and the minimal O2 sat was 94%.    I did review the patient's compliance report from 3/30/2024 to 6/27/2024.  The patient is using his CPAP about 54% of the time for 4 hours and 26 minutes a night.  He has a minimal mask leak and his AHI was 3.2.    The patient states that he had a lumbar epidural injection and that his back pain is improved to the extent that he is able to do more work prior to getting severe back pain.  The patient did have his MRI of the brain that showed multilevel degenerative changes and at L4-5 he had moderate to severe canal stenosis as well as contact of the traversing right L5 nerve root.  We the patient also had probable bilateral spondylosis resulting in an 8 mm grade 1 anterolisthesis at L5-S1.    The patient had an EMG nerve conduction study that showed a mild right median neuropathy at the wrist.  There is no evidence of polyneuropathy or lumbar radiculopathy on the right.  The patient's polyneuropathy panel is negative with the exception of  a mildly elevated hemoglobin A1c at 5.9.    The patient did see Dr. Prince who recommended physical therapy and pain management prior to any surgical procedures.  Review of Systems   All other systems reviewed and are negative.       Patient Active Problem List   Diagnosis    Spondylolisthesis of lumbosacral region    Lumbosacral stenosis with neurogenic claudication    Radiculopathy of lumbosacral region        Past Medical History:   Diagnosis Date    Hyperlipidemia     Hypertension         Past Surgical History:   Procedure Laterality Date    CHOLECYSTECTOMY      IR INTERVENTION VENOUS STENT      TONSILLECTOMY      VASECTOMY          Social History     Socioeconomic History    Marital status:      Spouse name: Not on file    Number of children: Not on file    Years of education: Not on file    Highest education level: Not on file   Occupational History    Not on file   Tobacco Use    Smoking status: Former     Types: Cigarettes     Passive exposure: Past    Smokeless tobacco: Never   Substance and Sexual Activity    Alcohol use: Yes     Alcohol/week: 2.0 standard drinks of alcohol     Types: 2 Standard drinks or equivalent per week     Comment: social    Drug use: Never    Sexual activity: Not on file   Other Topics Concern    Not on file   Social History Narrative    Not on file     Social Determinants of Health     Financial Resource Strain: Not on file   Food Insecurity: No Food Insecurity (5/9/2024)    Hunger Vital Sign     Worried About Running Out of Food in the Last Year: Never true     Ran Out of Food in the Last Year: Never true   Transportation Needs: Not on file   Physical Activity: Not on file   Stress: Not on file   Social Connections: Not on file   Intimate Partner Violence: Not on file   Housing Stability: Not on file        Family History   Problem Relation Name Age of Onset    Stroke Paternal Grandmother          Current Outpatient Medications   Medication Instructions    amLODIPine  "(NORVASC) 10 mg, oral, Daily    aspirin 81 mg, oral, Once    latanoprostene bunod (Vyzulta) 0.024 % drops ophthalmic (eye), Daily    metoprolol tartrate (LOPRESSOR) 25 mg, oral, 2 times daily    pantoprazole (PROTONIX) 40 mg, oral, Daily before breakfast, Do not crush, chew, or split.    Repatha SureClick 140 mg, subcutaneous, Every 14 days    sildenafil (VIAGRA) 50 mg, oral, Daily PRN        Allergies   Allergen Reactions    Statins-Hmg-Coa Reductase Inhibitors Unknown     Dr told not to take anymore due to elevated liver enzymes        Objective  /82 (BP Location: Right arm)   Pulse 60   Temp 36.7 °C (98.1 °F)   Resp 16   Ht 1.854 m (6' 1\")   Wt 108 kg (237 lb 9.6 oz)   BMI 31.35 kg/m²    GENERAL APPEARANCE:  No distress, alert and cooperative.     MENTAL STATE:  Orientation was normal to time, place and person. Recent and remote memory was intact.  Attention span and concentration were normal. Language testing was normal for comprehension, repetition, expression, and naming. Calculation was intact. The patient could correctly interpret a picture, and copy a diagram. General fund of knowledge was intact. Mini-mental status examination was performed with no errors.     CRANIAL NERVES:  Cranial nerves were normal.      CN 2- Visual Acuity  OD: 20/20 (corrected)   OS: 20/20 (corrected); visual fields full to confrontation.      CN 3, 4, 6-  Pupils round, 4 mm in diameter, equally reactive to light. No ptosis. EOMs normal alignment, full range of movement, no nystagmus     CN 5- Facial sensation intact bilaterally. Normal corneal reflexes.      CN 7- Normal and symmetric facial strength. Nasolabial folds symmetric.     CN 8- Hearing intact to finger rub, whisper.      CN 9- Palate elevates symmetrically. Normal gag reflex.      CN 11- Normal strength of shoulder shrug and neck turning      CN 12- Tongue midline, with normal bulk and strength; no fasciculations.     MOTOR:  Motor exam was normal. Muscle " bulk and tone were normal in both upper and lower extremities. Muscle strength was 5/5 in distal and proximal muscles in both upper and lower extremities. No fasciculations, tremor or other abnormal movements were present.     GAIT: Station was stable with a normal base and negative Romberg sign. Gait was stable with a normal arm swing and speed. No ataxia, shuffling, steppage or waddling was noted. Tandem gait was intact. Postural reflexes were normal.     Assessment/Plan   The patient is still having low back pain and is following with pain management.  The patient should try to stay as active as he can both mentally and physically.  The patient can use symptomatic pain medicines for severe back pain.  I would certainly like to avoid surgery as long as we can.  I did tell the patient to let us know should he get difficulties with lower extremity weakness or bowel or bladder issues.  The patient is having difficulty tolerating his CPAP pressure.  I will lower his pressure to 9 cm of water with an EPR of 6.  I would like to check a compliance report after he has been on this pressure for 2 weeks.    The patient needs to continue using CPAP every night and all night.  The patient needs to continue to change his mask and clean his machine with soap and water on a regular basis.  The patient needs to lose weight to his ideal body weight, avoid supine position, improve sleep hygiene and get least 8 hours of sleep at night.  The patient should not drive while drowsy.  The patient needs to wear a wrist splint on his right wrist as much as he can tolerate.  I discussed all these issues in detail with the patient and answered all of his questions.  The patient will follow-up with me in 1 year.

## 2024-07-01 NOTE — PATIENT INSTRUCTIONS
The patient is still having low back pain and is following with pain management.  The patient should try to stay as active as he can both mentally and physically.  The patient can use symptomatic pain medicines for severe back pain.  I would certainly like to avoid surgery as long as we can.  I did tell the patient to let us know should he get difficulties with lower extremity weakness or bowel or bladder issues.  The patient is having difficulty tolerating his CPAP pressure.  I will lower his pressure to 9 cm of water with an EPR of 6.  I would like to check a compliance report after he has been on this pressure for 2 weeks.    The patient needs to continue using CPAP every night and all night.  The patient needs to continue to change his mask and clean his machine with soap and water on a regular basis.  The patient needs to lose weight to his ideal body weight, avoid supine position, improve sleep hygiene and get least 8 hours of sleep at night.  The patient should not drive while drowsy.    The patient needs to wear a wrist splint on his right wrist as much as he can tolerate.  I discussed all these issues in detail with the patient and answered all of his questions.  The patient will follow-up with me in 1 year.

## 2024-07-11 ENCOUNTER — APPOINTMENT (OUTPATIENT)
Dept: PAIN MEDICINE | Facility: CLINIC | Age: 59
End: 2024-07-11
Payer: COMMERCIAL

## 2024-07-11 VITALS — SYSTOLIC BLOOD PRESSURE: 120 MMHG | OXYGEN SATURATION: 96 % | DIASTOLIC BLOOD PRESSURE: 79 MMHG | HEART RATE: 61 BPM

## 2024-07-11 DIAGNOSIS — M43.17 SPONDYLOLISTHESIS OF LUMBOSACRAL REGION: ICD-10-CM

## 2024-07-11 DIAGNOSIS — M54.17 RADICULOPATHY OF LUMBOSACRAL REGION: Primary | ICD-10-CM

## 2024-07-11 PROCEDURE — 1036F TOBACCO NON-USER: CPT | Performed by: ANESTHESIOLOGY

## 2024-07-11 PROCEDURE — 99213 OFFICE O/P EST LOW 20 MIN: CPT | Performed by: ANESTHESIOLOGY

## 2024-07-11 ASSESSMENT — ENCOUNTER SYMPTOMS
FEVER: 0
BLOOD IN STOOL: 0
BACK PAIN: 1
SHORTNESS OF BREATH: 0

## 2024-07-11 ASSESSMENT — PAIN SCALES - GENERAL: PAINLEVEL: 3

## 2024-07-11 NOTE — PROGRESS NOTES
Chief Complain  Low back pain       History Of Present Illness  Javy Carnes Jr. is a 58 y.o. male here for low back pain radiating to bilateral feet numbness. The patient rates the pain at 3  on a scale from 0-10.  The patient describes pain as aching.  The pain is worsened by bending forward, standing, and lifting and is alleviated by lying down.  Since the last visit the pain has improved.  The patient denies any fever, chills, weight loss, bladder/bowel incontinence.     Previous Procedures:  LESI: 50% pain improvement  Past Medical History  He has a past medical history of Hyperlipidemia and Hypertension.    Surgical History  He has a past surgical history that includes IR intervention venous stent; Cholecystectomy; Tonsillectomy; and Vasectomy.     Social History  He reports that he has quit smoking. His smoking use included cigarettes. He has been exposed to tobacco smoke. He has never used smokeless tobacco. He reports current alcohol use of about 2.0 standard drinks of alcohol per week. He reports that he does not use drugs.    Family History  Family History   Problem Relation Name Age of Onset    Stroke Paternal Grandmother          Allergies  Statins-hmg-coa reductase inhibitors    Review of Systems  Review of Systems   Constitutional:  Negative for fever.   Respiratory:  Negative for shortness of breath.    Cardiovascular:  Negative for chest pain.   Gastrointestinal:  Negative for blood in stool.   Musculoskeletal:  Positive for back pain.   Psychiatric/Behavioral:  Negative for suicidal ideas.         Physical Exam  Physical Exam  HENT:      Head: Normocephalic and atraumatic.   Eyes:      Extraocular Movements: Extraocular movements intact.      Pupils: Pupils are equal, round, and reactive to light.   Pulmonary:      Effort: Pulmonary effort is normal.   Musculoskeletal:      Cervical back: Neck supple.   Neurological:      Mental Status: He is alert.   Psychiatric:         Mood and Affect: Mood  normal.           Last Recorded Vitals  Blood pressure 120/79, pulse 61, SpO2 96%.       Assessment/Plan     Javy Carnes Jr. is a 58 y.o. male here for follow-up of chronic low back pain numbness bilateral feet.  Review of his MRI of lumbar spine reveals grade 1 anterolisthesis L5 over S1 with bilateral neuroforaminal stenosis as well as lateral stenosis at L4-5.  This visit we did L4-5 epidural steroid injection.  He reports 50% improvement in his pain since then.  I would recommend continuation of activity modification, regular home exercises.  Would consider repeating epidural steroid injection as needed at least 3 months from the last injection.          Conor Carrasco MD

## 2024-09-09 ENCOUNTER — DOCUMENTATION (OUTPATIENT)
Dept: PHYSICAL THERAPY | Facility: CLINIC | Age: 59
End: 2024-09-09
Payer: COMMERCIAL

## 2024-09-09 NOTE — PROGRESS NOTES
Physical Therapy    Discharge Summary    Name: Javy Carnes Jr.  MRN: [id number]  : 1965  Date: 24    Discharge Summary: PT    Discharge Information: Date of discharge 2024    Therapy Summary: Discharge due to patient not scheduling additional follow up visits.    Discharge Status: Discharge for PT from 24 to 6/10/24    Rehab Discharge Reason: Failed to schedule and/or keep follow-up appointment(s)

## 2024-10-30 ENCOUNTER — APPOINTMENT (OUTPATIENT)
Dept: NEUROSURGERY | Facility: CLINIC | Age: 59
End: 2024-10-30
Payer: COMMERCIAL

## 2024-10-30 DIAGNOSIS — D32.9 MENINGIOMA (MULTI): Primary | ICD-10-CM

## 2024-10-30 PROCEDURE — 99214 OFFICE O/P EST MOD 30 MIN: CPT | Performed by: PHYSICIAN ASSISTANT

## 2024-10-31 ENCOUNTER — APPOINTMENT (OUTPATIENT)
Facility: CLINIC | Age: 59
End: 2024-10-31
Payer: COMMERCIAL

## 2024-10-31 VITALS — SYSTOLIC BLOOD PRESSURE: 136 MMHG | TEMPERATURE: 97.9 F | HEART RATE: 61 BPM | DIASTOLIC BLOOD PRESSURE: 87 MMHG

## 2024-10-31 DIAGNOSIS — N45.3 EPIDIDYMOORCHITIS: Primary | ICD-10-CM

## 2024-10-31 PROCEDURE — 99203 OFFICE O/P NEW LOW 30 MIN: CPT | Performed by: STUDENT IN AN ORGANIZED HEALTH CARE EDUCATION/TRAINING PROGRAM

## 2024-10-31 PROCEDURE — 1036F TOBACCO NON-USER: CPT | Performed by: STUDENT IN AN ORGANIZED HEALTH CARE EDUCATION/TRAINING PROGRAM

## 2024-10-31 RX ORDER — METOPROLOL TARTRATE 50 MG/1
50 TABLET ORAL 2 TIMES DAILY
COMMUNITY
Start: 2024-10-28

## 2024-10-31 RX ORDER — CIPROFLOXACIN 500 MG/1
500 TABLET ORAL 2 TIMES DAILY
Qty: 56 TABLET | Refills: 0 | Status: SHIPPED | OUTPATIENT
Start: 2024-10-31 | End: 2024-11-28

## 2024-12-03 ENCOUNTER — APPOINTMENT (OUTPATIENT)
Facility: CLINIC | Age: 59
End: 2024-12-03
Payer: COMMERCIAL

## 2024-12-03 VITALS
HEIGHT: 73 IN | WEIGHT: 239.8 LBS | SYSTOLIC BLOOD PRESSURE: 121 MMHG | DIASTOLIC BLOOD PRESSURE: 82 MMHG | BODY MASS INDEX: 31.78 KG/M2 | HEART RATE: 68 BPM

## 2024-12-03 DIAGNOSIS — Z12.5 PROSTATE CANCER SCREENING: ICD-10-CM

## 2024-12-03 DIAGNOSIS — N45.3 EPIDIDYMOORCHITIS: Primary | ICD-10-CM

## 2024-12-03 PROCEDURE — 1036F TOBACCO NON-USER: CPT | Performed by: STUDENT IN AN ORGANIZED HEALTH CARE EDUCATION/TRAINING PROGRAM

## 2024-12-03 PROCEDURE — 99213 OFFICE O/P EST LOW 20 MIN: CPT | Performed by: STUDENT IN AN ORGANIZED HEALTH CARE EDUCATION/TRAINING PROGRAM

## 2024-12-03 PROCEDURE — 3008F BODY MASS INDEX DOCD: CPT | Performed by: STUDENT IN AN ORGANIZED HEALTH CARE EDUCATION/TRAINING PROGRAM

## 2024-12-03 NOTE — PROGRESS NOTES
Follow-up (1 month)   Chief Complaint    Follow-up        Referring physician: No ref. provider found     SUBJECTIVE:  HPI:   Javy Carnes Jr. is a 59 y.o. male with a history of HTN, GERD, OA, CAD s/p PCI x1 on ASA 81, meatal condyloma s/p excision about 20y ago, vasectomy who presents for follow up of right epididymoorchitis.    Took 4 weeks of cipro, pain has improved, no further n/v, no fevers, still voiding well.  Has times with no pain, intermittently 2-3/10.  No noticeable swelling.    Has not had PSA checked to his knowledge.  No family hx of prostate cancer.  .    From initial consult 10/31:  2 weeks of dull right testicular pain started after an episode of nausea/vomiting.  Associated with mild swelling.  Denies fevers, urinary issues.    Past Medical History:   Diagnosis Date    Hyperlipidemia     Hypertension         Past medical, surgical, family and social history in the chart was reviewed and is accurate including any additions to what is in this HPI.    ROS:  14-point review of systems negative except as noted above.    OBJECTIVE:  Visit Vitals  /82   Pulse 68     Body mass index is 31.64 kg/m².  Physical Exam   General:  No acute distress  HEENT:  EOMI  CV:  Regular rate  Pulm:  Nonlabored respirations  Abd:  Soft, non-distended  :  Uncirc phallus, orthotopic patent meatus, no condylomata, bl descended testes symmetric no swelling, mild right tenderness without edema/induration/fluctuance/erythema  MSK:  No contractures  Neuro:  Motor intact  Psych:  Appropriate affect    Labs:  Lab Results   Component Value Date    WBC 9.1 02/10/2023    HGB 14.6 02/10/2023    HCT 45.2 02/10/2023     02/10/2023    CHOL 143 02/10/2023    TRIG 180 (H) 02/10/2023    HDL 34.6 (A) 02/10/2023    ALT 33 10/21/2023    AST 19 10/21/2023     10/21/2023    K 4.8 10/21/2023     10/21/2023    CREATININE 1.02 10/21/2023    BUN 14 10/21/2023    CO2 27 10/21/2023    TSH 3.61 10/21/2023    HGBA1C  "5.9 (H) 10/21/2023     No results found for: \"URINECULTURE\"   No results found for: \"PSA\"    IMAGING:  All imaging discussed in HPI was independently reviewed.    ASSESSMENT:  Right epididymoorchitis  Prostate cancer screening    PLAN:  Observe, repeat exam in 1 month  Consider US and cysto to exclude stricture if recurring while off abx  Recommend annual PSA between ages 45-70 for average risk prostate cancer screening after infection issue subsides  Follow-up 1 month    Rashaad Jane MD    Problem List Items Addressed This Visit    None       "

## 2025-01-03 ENCOUNTER — APPOINTMENT (OUTPATIENT)
Facility: CLINIC | Age: 60
End: 2025-01-03
Payer: COMMERCIAL

## 2025-01-03 VITALS
HEIGHT: 73 IN | DIASTOLIC BLOOD PRESSURE: 86 MMHG | BODY MASS INDEX: 32.1 KG/M2 | SYSTOLIC BLOOD PRESSURE: 135 MMHG | WEIGHT: 242.2 LBS | HEART RATE: 66 BPM

## 2025-01-03 DIAGNOSIS — N45.3 EPIDIDYMOORCHITIS: Primary | ICD-10-CM

## 2025-01-03 PROCEDURE — 99212 OFFICE O/P EST SF 10 MIN: CPT | Performed by: STUDENT IN AN ORGANIZED HEALTH CARE EDUCATION/TRAINING PROGRAM

## 2025-01-03 PROCEDURE — 1036F TOBACCO NON-USER: CPT | Performed by: STUDENT IN AN ORGANIZED HEALTH CARE EDUCATION/TRAINING PROGRAM

## 2025-01-03 PROCEDURE — 3008F BODY MASS INDEX DOCD: CPT | Performed by: STUDENT IN AN ORGANIZED HEALTH CARE EDUCATION/TRAINING PROGRAM

## 2025-01-03 RX ORDER — PERFLUOROHEXYLOCTANE 1 MG/MG
SOLUTION OPHTHALMIC
COMMUNITY
Start: 2024-12-18

## 2025-01-03 NOTE — PROGRESS NOTES
Follow-up (1 month)   Chief Complaint    Follow-up        Referring physician: No ref. provider found     SUBJECTIVE:  HPI:   Javy Carnes Jr. is a 59 y.o. male with a history of HTN, GERD, OA, CAD s/p PCI x1 on ASA 81, meatal condyloma s/p excision about 20y ago, vasectomy who presents for follow up of right epididymoorchitis.     Initially seen 10/31 with 2 weeks of dull right testicular pain after episode of n/v, mild swelling.  Took 4 weeks cipro.  Improved but not entirely resolved on 12/3.  Has noticed slight improvement, still having times with no pain then intermittently dull throbbing right testicular pain.  Still no fevers, redness, warmth, no recurrent n/v.  Perhaps worse when sitting.    Denies any voiding issues eg weak or split stream.    Past Medical History:   Diagnosis Date    Hyperlipidemia     Hypertension       Past medical, surgical, family and social history in the chart was reviewed and is accurate including any additions to what is in this HPI.    ROS:  14-point review of systems negative except as noted above.    OBJECTIVE:  Visit Vitals  /86   Pulse 66     Body mass index is 31.95 kg/m².  Physical Exam   General:  No acute distress  HEENT:  EOMI  CV:  Regular rate  Pulm:  Nonlabored respirations  Abd:  Soft, non-distended  :  Uncirc phallus, orthotopic patent meatus, no condylomata, bl descended testes without masses no visible swelling, some fullness to right epididymis (not noted previously) possibly epididymal cyst  MSK:  No contractures  Neuro:  Motor intact  Psych:  Appropriate affect    Labs:  Lab Results   Component Value Date    WBC 9.1 02/10/2023    HGB 14.6 02/10/2023    HCT 45.2 02/10/2023     02/10/2023    CHOL 143 02/10/2023    TRIG 180 (H) 02/10/2023    HDL 34.6 (A) 02/10/2023    ALT 33 10/21/2023    AST 19 10/21/2023     10/21/2023    K 4.8 10/21/2023     10/21/2023    CREATININE 1.02 10/21/2023    BUN 14 10/21/2023    CO2 27 10/21/2023    TSH 3.61  "10/21/2023    HGBA1C 5.9 (H) 10/21/2023     No results found for: \"URINECULTURE\"   No results found for: \"PSA\"    IMAGING:  All imaging discussed in HPI was independently reviewed.    ASSESSMENT:  Right epididymoorchitis  Prostate cancer screening    PLAN:  Check scrotal US to ensure no underlying adverse pathology  Follow up in 2 weeks to review  Cysto to exclude stricture if worse  Recommend annual PSA between ages 45-70 for average risk prostate cancer screening after infection issue subsides      Rashaad Jane MD    Problem List Items Addressed This Visit    None  Visit Diagnoses       Epididymoorchitis    -  Primary    Relevant Orders    US scrotum             "

## 2025-01-07 ENCOUNTER — HOSPITAL ENCOUNTER (OUTPATIENT)
Dept: RADIOLOGY | Facility: HOSPITAL | Age: 60
Discharge: HOME | End: 2025-01-07
Payer: COMMERCIAL

## 2025-01-07 DIAGNOSIS — N45.3 EPIDIDYMOORCHITIS: ICD-10-CM

## 2025-01-07 PROCEDURE — 93975 VASCULAR STUDY: CPT

## 2025-01-07 PROCEDURE — 76870 US EXAM SCROTUM: CPT | Performed by: STUDENT IN AN ORGANIZED HEALTH CARE EDUCATION/TRAINING PROGRAM

## 2025-01-08 ENCOUNTER — TELEPHONE (OUTPATIENT)
Dept: NEUROSURGERY | Facility: HOSPITAL | Age: 60
End: 2025-01-08
Payer: COMMERCIAL

## 2025-01-08 NOTE — TELEPHONE ENCOUNTER
Result Communication    Resulted Orders   MR brain w and wo IV contrast    Narrative    EXAMINATION:  MRI Brain Without and with IV Contrast    TECHNIQUE:  Multiplanar, multisequence MRI of the brain without and with IV contrast.    COMPARISON:  10/21/2024    HISTORY:      WHAT SYMPTOMS ARE YOU EXPERIENCING?; Meningioma surveillance, 11 ml contrast administered.D32.9;OTHER REASON    FINDINGS:  INTRACRANIAL STRUCTURES/VENTRICLES: There is no acute infarct. No mass effect or midline shift. No evidence of an acute intracranial hemorrhage.  The ventricles and sulci are mildly enlarged consistent with mild age-related atrophy. There are scattered FLAIR hyperintensities in the cerebral white matter which are nonspecific but may represent mild chronic microvascular angiopathy in a patient of this age. The sellar/suprasellar regions appear unremarkable.  The normal signal voids within the major intracranial vessels appear maintained. No abnormal parenchymal enhancement. In the anterior right temporal fossa there is an extra-axial mass which is partially calcified and measures 1.5 x 0.9 cm consistent with a small meningioma, not significantly changed from the prior. 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    ORBITS: The visualized portion of the orbits demonstrate no acute abnormality.    SINUSES: Minimal mucosal thickening in the paranasal sinuses. Mastoid air cells are predominantly clear with trace fluid seen bilaterally.    BONES/SOFT TISSUES: The bone marrow signal intensity appears normal. The soft tissues demonstrate no acute abnormality.    IMPRESSION:  1.  No acute intracranial abnormality.  2.  Stable right temporal fossa meningioma.    Electronically signed by:  Ceferino Pan MD  01/08/2025 10:23 AM [facility] Workstation: [id number]  Technologist:  DONNA ROY  Dictated By:   CEFERINO PAN MD  Signed By:     CEFERINO PAN MD    Signed Out:    01/08/25 10:23:40       3:35 PM      Results were  successfully communicated with the patient and they acknowledged their understanding.    No significant change in size of meningioma. Would recommend re-visit and surveillance scan in ~1 year.

## 2025-01-16 ENCOUNTER — APPOINTMENT (OUTPATIENT)
Facility: CLINIC | Age: 60
End: 2025-01-16
Payer: COMMERCIAL

## 2025-01-16 VITALS
BODY MASS INDEX: 31.83 KG/M2 | WEIGHT: 240.2 LBS | HEIGHT: 73 IN | TEMPERATURE: 98 F | HEART RATE: 61 BPM | DIASTOLIC BLOOD PRESSURE: 82 MMHG | SYSTOLIC BLOOD PRESSURE: 125 MMHG

## 2025-01-16 DIAGNOSIS — N45.3 EPIDIDYMOORCHITIS: ICD-10-CM

## 2025-01-16 DIAGNOSIS — R10.31 RIGHT GROIN PAIN: Primary | ICD-10-CM

## 2025-01-16 DIAGNOSIS — Z12.5 PROSTATE CANCER SCREENING: ICD-10-CM

## 2025-01-16 PROCEDURE — 1036F TOBACCO NON-USER: CPT | Performed by: STUDENT IN AN ORGANIZED HEALTH CARE EDUCATION/TRAINING PROGRAM

## 2025-01-16 PROCEDURE — 99213 OFFICE O/P EST LOW 20 MIN: CPT | Performed by: STUDENT IN AN ORGANIZED HEALTH CARE EDUCATION/TRAINING PROGRAM

## 2025-01-16 PROCEDURE — 3008F BODY MASS INDEX DOCD: CPT | Performed by: STUDENT IN AN ORGANIZED HEALTH CARE EDUCATION/TRAINING PROGRAM

## 2025-01-16 NOTE — PROGRESS NOTES
Follow-up (results)   Chief Complaint    Follow-up        Referring physician: No ref. provider found     SUBJECTIVE:  HPI:   Javy Carnes Jr. is a 59 y.o. male with a history of HTN, GERD, OA, CAD s/p PCI x1 on ASA 81, meatal condyloma s/p excision about 20y ago, vasectomy who presents for follow up of right epididymoorchitis.     Persistent mild right testicular pain.  Episodic, bad on Sunday and again last night.  No appreciable triggers or other concurrent symptoms eg swelling, fever, n/v.  Radiates to right penile shaft.  No urinary or erectile/ejaculatory issues.    Reviewed US - nothing abnormal.  Comment in report on bl epididymal heterogeneity.  No masses, discussed with patient.    From prior note:  Initially seen 10/31 with 2 weeks of dull right testicular pain after episode of n/v, mild swelling.  Took 4 weeks cipro.  Improved but not entirely resolved on 12/3.  Has noticed slight improvement, still having times with no pain then intermittently dull throbbing right testicular pain.  Still no fevers, redness, warmth, no recurrent n/v.  Perhaps worse when sitting.  Denies any voiding issues eg weak or split stream.    Past Medical History:   Diagnosis Date    Hyperlipidemia     Hypertension         Past medical, surgical, family and social history in the chart was reviewed and is accurate including any additions to what is in this HPI.    ROS:  14-point review of systems negative except as noted above.    OBJECTIVE:  Visit Vitals  /82   Pulse 61   Temp 36.7 °C (98 °F)     Body mass index is 31.69 kg/m².  Physical Exam   General:  No acute distress  HEENT:  EOMI  CV:  Regular rate  Pulm:  Nonlabored respirations  Abd:  Soft, non-distended  :  Circumcised phallus, orthotopic patent meatus, bl descended testes without masses - no appreciable right epididymal fullness as previous  MSK:  No contractures  Neuro:  Motor intact  Psych:  Appropriate affect    Labs:  Lab Results   Component Value Date     "WBC 9.1 02/10/2023    HGB 14.6 02/10/2023    HCT 45.2 02/10/2023     02/10/2023    CHOL 143 02/10/2023    TRIG 180 (H) 02/10/2023    HDL 34.6 (A) 02/10/2023    ALT 33 10/21/2023    AST 19 10/21/2023     10/21/2023    K 4.8 10/21/2023     10/21/2023    CREATININE 1.02 10/21/2023    BUN 14 10/21/2023    CO2 27 10/21/2023    TSH 3.61 10/21/2023    HGBA1C 5.9 (H) 10/21/2023     No results found for: \"URINECULTURE\"   No results found for: \"PSA\"    IMAGING:  All imaging discussed in HPI was independently reviewed.    ASSESSMENT:  Atypical right groin/testicular pain  Right epididymoorchitis - resolved; no clinical sign of infection at this point  Prostate cancer screening    Discussed no concerning findings to explain pain.  Suspect right epididymoorchitis which has resolved but still having persistent symptoms.  DDX includes pelvic floor dysfunction, atypical small right hernia or kidney stone.    Offered pelvic floor PT and CT to exclude hernia, nephrolithiasis vs observation.  Will observe and if persistent issues in 3 months will follow up with CT.  May pursue PFPT at that time.    PLAN:  Observe pain  If persistent check CT in 3 months  Consider PFPT  Given hx of meatal condyloma if new LUTS consider cysto to exclude stricture  Recommend annual PSA between ages 45-70 for average risk prostate cancer screening   Follow-up 3 months with CT    Rashaad Jane MD    Problem List Items Addressed This Visit    None  Visit Diagnoses       Right groin pain    -  Primary    Relevant Orders    CT abdomen pelvis wo IV contrast    Epididymoorchitis        Prostate cancer screening                 "

## 2025-04-17 ENCOUNTER — APPOINTMENT (OUTPATIENT)
Facility: CLINIC | Age: 60
End: 2025-04-17
Payer: COMMERCIAL

## 2025-06-03 ENCOUNTER — OFFICE (OUTPATIENT)
Dept: URBAN - METROPOLITAN AREA CLINIC 26 | Facility: CLINIC | Age: 60
End: 2025-06-03
Payer: COMMERCIAL

## 2025-06-03 VITALS
WEIGHT: 240 LBS | TEMPERATURE: 98.2 F | HEIGHT: 73 IN | HEART RATE: 63 BPM | DIASTOLIC BLOOD PRESSURE: 89 MMHG | SYSTOLIC BLOOD PRESSURE: 150 MMHG

## 2025-06-03 DIAGNOSIS — R11.10 VOMITING, UNSPECIFIED: ICD-10-CM

## 2025-06-03 DIAGNOSIS — K59.00 CONSTIPATION, UNSPECIFIED: ICD-10-CM

## 2025-06-03 DIAGNOSIS — R11.0 NAUSEA: ICD-10-CM

## 2025-06-03 DIAGNOSIS — Z86.0100 PERSONAL HISTORY OF COLON POLYPS, UNSPECIFIED: ICD-10-CM

## 2025-06-03 DIAGNOSIS — R10.84 GENERALIZED ABDOMINAL PAIN: ICD-10-CM

## 2025-06-03 PROCEDURE — 99204 OFFICE O/P NEW MOD 45 MIN: CPT | Performed by: INTERNAL MEDICINE

## 2025-06-10 VITALS
DIASTOLIC BLOOD PRESSURE: 96 MMHG | SYSTOLIC BLOOD PRESSURE: 143 MMHG | SYSTOLIC BLOOD PRESSURE: 160 MMHG | RESPIRATION RATE: 9 BRPM | SYSTOLIC BLOOD PRESSURE: 171 MMHG | RESPIRATION RATE: 9 BRPM | DIASTOLIC BLOOD PRESSURE: 111 MMHG | OXYGEN SATURATION: 99 % | DIASTOLIC BLOOD PRESSURE: 94 MMHG | DIASTOLIC BLOOD PRESSURE: 98 MMHG | OXYGEN SATURATION: 84 % | SYSTOLIC BLOOD PRESSURE: 119 MMHG | HEART RATE: 69 BPM | RESPIRATION RATE: 11 BRPM | OXYGEN SATURATION: 91 % | DIASTOLIC BLOOD PRESSURE: 102 MMHG | HEART RATE: 73 BPM | HEART RATE: 73 BPM | HEART RATE: 69 BPM | OXYGEN SATURATION: 97 % | DIASTOLIC BLOOD PRESSURE: 111 MMHG | DIASTOLIC BLOOD PRESSURE: 84 MMHG | OXYGEN SATURATION: 84 % | SYSTOLIC BLOOD PRESSURE: 122 MMHG | HEART RATE: 56 BPM | OXYGEN SATURATION: 88 % | RESPIRATION RATE: 9 BRPM | DIASTOLIC BLOOD PRESSURE: 98 MMHG | DIASTOLIC BLOOD PRESSURE: 89 MMHG | OXYGEN SATURATION: 98 % | SYSTOLIC BLOOD PRESSURE: 119 MMHG | HEART RATE: 61 BPM | DIASTOLIC BLOOD PRESSURE: 76 MMHG | OXYGEN SATURATION: 95 % | HEART RATE: 79 BPM | WEIGHT: 240 LBS | DIASTOLIC BLOOD PRESSURE: 111 MMHG | SYSTOLIC BLOOD PRESSURE: 162 MMHG | OXYGEN SATURATION: 91 % | DIASTOLIC BLOOD PRESSURE: 76 MMHG | SYSTOLIC BLOOD PRESSURE: 172 MMHG | OXYGEN SATURATION: 88 % | OXYGEN SATURATION: 95 % | OXYGEN SATURATION: 98 % | HEART RATE: 61 BPM | HEART RATE: 64 BPM | HEIGHT: 73 IN | HEART RATE: 79 BPM | TEMPERATURE: 97.6 F | DIASTOLIC BLOOD PRESSURE: 102 MMHG | SYSTOLIC BLOOD PRESSURE: 147 MMHG | OXYGEN SATURATION: 93 % | HEIGHT: 73 IN | RESPIRATION RATE: 8 BRPM | OXYGEN SATURATION: 76 % | DIASTOLIC BLOOD PRESSURE: 105 MMHG | DIASTOLIC BLOOD PRESSURE: 89 MMHG | DIASTOLIC BLOOD PRESSURE: 105 MMHG | SYSTOLIC BLOOD PRESSURE: 137 MMHG | RESPIRATION RATE: 10 BRPM | OXYGEN SATURATION: 80 % | HEIGHT: 73 IN | OXYGEN SATURATION: 91 % | OXYGEN SATURATION: 76 % | TEMPERATURE: 97.6 F | RESPIRATION RATE: 12 BRPM | RESPIRATION RATE: 12 BRPM | HEART RATE: 73 BPM | SYSTOLIC BLOOD PRESSURE: 137 MMHG | OXYGEN SATURATION: 80 % | SYSTOLIC BLOOD PRESSURE: 146 MMHG | SYSTOLIC BLOOD PRESSURE: 122 MMHG | DIASTOLIC BLOOD PRESSURE: 96 MMHG | DIASTOLIC BLOOD PRESSURE: 90 MMHG | SYSTOLIC BLOOD PRESSURE: 147 MMHG | DIASTOLIC BLOOD PRESSURE: 96 MMHG | RESPIRATION RATE: 8 BRPM | HEART RATE: 61 BPM | DIASTOLIC BLOOD PRESSURE: 90 MMHG | RESPIRATION RATE: 7 BRPM | RESPIRATION RATE: 11 BRPM | SYSTOLIC BLOOD PRESSURE: 143 MMHG | DIASTOLIC BLOOD PRESSURE: 82 MMHG | HEART RATE: 68 BPM | WEIGHT: 240 LBS | OXYGEN SATURATION: 88 % | SYSTOLIC BLOOD PRESSURE: 172 MMHG | SYSTOLIC BLOOD PRESSURE: 119 MMHG | SYSTOLIC BLOOD PRESSURE: 146 MMHG | SYSTOLIC BLOOD PRESSURE: 162 MMHG | DIASTOLIC BLOOD PRESSURE: 89 MMHG | OXYGEN SATURATION: 93 % | SYSTOLIC BLOOD PRESSURE: 171 MMHG | DIASTOLIC BLOOD PRESSURE: 94 MMHG | SYSTOLIC BLOOD PRESSURE: 146 MMHG | HEART RATE: 56 BPM | DIASTOLIC BLOOD PRESSURE: 82 MMHG | HEART RATE: 79 BPM | RESPIRATION RATE: 7 BRPM | OXYGEN SATURATION: 84 % | RESPIRATION RATE: 10 BRPM | DIASTOLIC BLOOD PRESSURE: 98 MMHG | SYSTOLIC BLOOD PRESSURE: 137 MMHG | HEART RATE: 69 BPM | SYSTOLIC BLOOD PRESSURE: 122 MMHG | DIASTOLIC BLOOD PRESSURE: 84 MMHG | OXYGEN SATURATION: 99 % | WEIGHT: 240 LBS | DIASTOLIC BLOOD PRESSURE: 76 MMHG | DIASTOLIC BLOOD PRESSURE: 84 MMHG | OXYGEN SATURATION: 97 % | OXYGEN SATURATION: 93 % | RESPIRATION RATE: 10 BRPM | OXYGEN SATURATION: 95 % | RESPIRATION RATE: 12 BRPM | RESPIRATION RATE: 7 BRPM | HEART RATE: 64 BPM | SYSTOLIC BLOOD PRESSURE: 143 MMHG | DIASTOLIC BLOOD PRESSURE: 90 MMHG | SYSTOLIC BLOOD PRESSURE: 147 MMHG | HEART RATE: 64 BPM | RESPIRATION RATE: 8 BRPM | HEART RATE: 68 BPM | OXYGEN SATURATION: 76 % | SYSTOLIC BLOOD PRESSURE: 162 MMHG | SYSTOLIC BLOOD PRESSURE: 160 MMHG | TEMPERATURE: 97.6 F | DIASTOLIC BLOOD PRESSURE: 82 MMHG | HEART RATE: 68 BPM | SYSTOLIC BLOOD PRESSURE: 171 MMHG | DIASTOLIC BLOOD PRESSURE: 94 MMHG | HEART RATE: 56 BPM | DIASTOLIC BLOOD PRESSURE: 105 MMHG | OXYGEN SATURATION: 99 % | SYSTOLIC BLOOD PRESSURE: 172 MMHG | OXYGEN SATURATION: 98 % | RESPIRATION RATE: 11 BRPM | OXYGEN SATURATION: 97 % | DIASTOLIC BLOOD PRESSURE: 102 MMHG | OXYGEN SATURATION: 80 % | SYSTOLIC BLOOD PRESSURE: 160 MMHG

## 2025-06-11 ENCOUNTER — OFFICE (OUTPATIENT)
Dept: URBAN - METROPOLITAN AREA PATHOLOGY 2 | Facility: PATHOLOGY | Age: 60
End: 2025-06-11
Payer: COMMERCIAL

## 2025-06-11 ENCOUNTER — AMBULATORY SURGICAL CENTER (OUTPATIENT)
Dept: URBAN - METROPOLITAN AREA SURGERY 12 | Facility: SURGERY | Age: 60
End: 2025-06-11
Payer: COMMERCIAL

## 2025-06-11 DIAGNOSIS — R11.0 NAUSEA: ICD-10-CM

## 2025-06-11 DIAGNOSIS — R10.84 GENERALIZED ABDOMINAL PAIN: ICD-10-CM

## 2025-06-11 DIAGNOSIS — K22.70 BARRETT'S ESOPHAGUS WITHOUT DYSPLASIA: ICD-10-CM

## 2025-06-11 DIAGNOSIS — K31.7 POLYP OF STOMACH AND DUODENUM: ICD-10-CM

## 2025-06-11 DIAGNOSIS — K22.89 OTHER SPECIFIED DISEASE OF ESOPHAGUS: ICD-10-CM

## 2025-06-11 DIAGNOSIS — K29.70 GASTRITIS, UNSPECIFIED, WITHOUT BLEEDING: ICD-10-CM

## 2025-06-11 DIAGNOSIS — K31.89 OTHER DISEASES OF STOMACH AND DUODENUM: ICD-10-CM

## 2025-06-11 PROCEDURE — 43239 EGD BIOPSY SINGLE/MULTIPLE: CPT | Performed by: INTERNAL MEDICINE

## 2025-06-11 PROCEDURE — 88305 TISSUE EXAM BY PATHOLOGIST: CPT | Performed by: PATHOLOGY

## 2025-06-11 PROCEDURE — 88313 SPECIAL STAINS GROUP 2: CPT | Performed by: PATHOLOGY

## 2025-06-11 PROCEDURE — 88342 IMHCHEM/IMCYTCHM 1ST ANTB: CPT | Performed by: PATHOLOGY

## 2025-06-11 RX ORDER — SUCRALFATE 1 G/1
1 TABLET ORAL
Qty: 90 | Refills: 3 | Status: ACTIVE
Start: 2025-06-11

## 2025-07-07 ENCOUNTER — APPOINTMENT (OUTPATIENT)
Dept: RADIOLOGY | Facility: HOSPITAL | Age: 60
End: 2025-07-07
Payer: COMMERCIAL

## 2025-07-07 DIAGNOSIS — R10.31 RIGHT GROIN PAIN: ICD-10-CM

## 2025-07-07 PROCEDURE — 74176 CT ABD & PELVIS W/O CONTRAST: CPT | Performed by: RADIOLOGY

## 2025-07-07 PROCEDURE — 74176 CT ABD & PELVIS W/O CONTRAST: CPT
